# Patient Record
Sex: MALE | Race: WHITE | NOT HISPANIC OR LATINO | Employment: FULL TIME | URBAN - METROPOLITAN AREA
[De-identification: names, ages, dates, MRNs, and addresses within clinical notes are randomized per-mention and may not be internally consistent; named-entity substitution may affect disease eponyms.]

---

## 2023-09-13 ENCOUNTER — HOSPITAL ENCOUNTER (OUTPATIENT)
Dept: DATA CONVERSION | Facility: HOSPITAL | Age: 43
Discharge: HOME | End: 2023-09-13
Payer: COMMERCIAL

## 2023-09-13 DIAGNOSIS — Z00.00 ENCOUNTER FOR GENERAL ADULT MEDICAL EXAMINATION WITHOUT ABNORMAL FINDINGS: ICD-10-CM

## 2023-09-13 DIAGNOSIS — D64.9 ANEMIA, UNSPECIFIED: ICD-10-CM

## 2023-09-13 DIAGNOSIS — Z13.6 ENCOUNTER FOR SCREENING FOR CARDIOVASCULAR DISORDERS: ICD-10-CM

## 2023-09-13 DIAGNOSIS — E55.9 VITAMIN D DEFICIENCY, UNSPECIFIED: ICD-10-CM

## 2023-09-13 LAB
25(OH)D3 SERPL-MCNC: 32 NG/ML (ref 31–100)
ALBUMIN SERPL-MCNC: 4.9 GM/DL (ref 3.5–5)
ALBUMIN/GLOB SERPL: 1.7 RATIO (ref 1.5–3)
ALP BLD-CCNC: 62 U/L (ref 35–125)
ALT SERPL-CCNC: 47 U/L (ref 5–40)
ANION GAP SERPL CALCULATED.3IONS-SCNC: 14 MMOL/L (ref 0–19)
APPEARANCE PLAS: CLEAR
AST SERPL-CCNC: 40 U/L (ref 5–40)
BILIRUB SERPL-MCNC: 0.6 MG/DL (ref 0.1–1.2)
BUN SERPL-MCNC: 10 MG/DL (ref 8–25)
BUN/CREAT SERPL: 12.5 RATIO (ref 8–21)
CALCIUM SERPL-MCNC: 9.5 MG/DL (ref 8.5–10.4)
CHLORIDE SERPL-SCNC: 99 MMOL/L (ref 97–107)
CHOLEST SERPL-MCNC: 169 MG/DL (ref 133–200)
CHOLEST/HDLC SERPL: 2.1 RATIO
CO2 SERPL-SCNC: 24 MMOL/L (ref 24–31)
COLOR SPUN FLD: YELLOW
CREAT SERPL-MCNC: 0.8 MG/DL (ref 0.4–1.6)
DEPRECATED RDW RBC AUTO: 47.8 FL (ref 37–54)
ERYTHROCYTE [DISTWIDTH] IN BLOOD BY AUTOMATED COUNT: 13.3 % (ref 11.7–15)
FASTING STATUS PATIENT QL REPORTED: ABNORMAL
GFR SERPL CREATININE-BSD FRML MDRD: 113 ML/MIN/1.73 M2
GLOBULIN SER-MCNC: 2.9 G/DL (ref 1.9–3.7)
GLUCOSE SERPL-MCNC: 89 MG/DL (ref 65–99)
HCT VFR BLD AUTO: 36.9 % (ref 41–50)
HDLC SERPL-MCNC: 81 MG/DL
HGB BLD-MCNC: 12.4 GM/DL (ref 13.5–16.5)
LDLC SERPL CALC-MCNC: 80 MG/DL (ref 65–130)
MCH RBC QN AUTO: 32.5 PG (ref 26–34)
MCHC RBC AUTO-ENTMCNC: 33.6 % (ref 31–37)
MCV RBC AUTO: 96.9 FL (ref 80–100)
NRBC BLD-RTO: 0 /100 WBC
PLATELET # BLD AUTO: 126 K/UL (ref 150–450)
PMV BLD AUTO: 11.5 CU (ref 7–12.6)
POTASSIUM SERPL-SCNC: 4.2 MMOL/L (ref 3.4–5.1)
PROT SERPL-MCNC: 7.8 G/DL (ref 5.9–7.9)
RBC # BLD AUTO: 3.81 M/UL (ref 4.5–5.5)
SODIUM SERPL-SCNC: 137 MMOL/L (ref 133–145)
TRIGL SERPL-MCNC: 38 MG/DL (ref 40–150)
TSH SERPL DL<=0.05 MIU/L-ACNC: 1.48 MIU/L (ref 0.27–4.2)
WBC # BLD AUTO: 6.3 K/UL (ref 4.5–11)

## 2023-09-14 LAB
FERRITIN SERPL-MCNC: 493 NG/ML (ref 30–400)
FOLATE SERPL-MCNC: 20 NG/ML (ref 4.2–19.9)
IRON SATN MFR SERPL: 27.3 % (ref 12–50)
IRON SERPL-MCNC: 74 UG/DL (ref 45–160)
TIBC SERPL-MCNC: 271 UG/DL (ref 228–428)
VIT B12 SERPL-MCNC: 718 PG/ML (ref 211–946)

## 2023-11-25 DIAGNOSIS — I10 ESSENTIAL (PRIMARY) HYPERTENSION: ICD-10-CM

## 2023-11-27 RX ORDER — LISINOPRIL 40 MG/1
40 TABLET ORAL DAILY
Qty: 90 TABLET | Refills: 0 | Status: SHIPPED | OUTPATIENT
Start: 2023-11-27 | End: 2024-01-13 | Stop reason: HOSPADM

## 2023-12-28 DIAGNOSIS — M06.09 POLYARTHRITIS WITH NEGATIVE RHEUMATOID FACTOR (MULTI): ICD-10-CM

## 2023-12-28 PROBLEM — R20.2 PARESTHESIA OF SKIN: Status: ACTIVE | Noted: 2023-12-28

## 2023-12-28 PROBLEM — R79.89 ELEVATED PROLACTIN LEVEL: Status: ACTIVE | Noted: 2023-12-28

## 2023-12-28 PROBLEM — F41.9 ANXIETY: Status: ACTIVE | Noted: 2023-12-28

## 2023-12-28 PROBLEM — I16.0 HYPERTENSIVE URGENCY: Status: ACTIVE | Noted: 2023-12-28

## 2023-12-28 PROBLEM — S91.319A FOOT LACERATION: Status: ACTIVE | Noted: 2023-12-28

## 2023-12-28 PROBLEM — I10 PRIMARY HYPERTENSION: Status: ACTIVE | Noted: 2023-12-28

## 2023-12-28 PROBLEM — E78.5 HYPERLIPIDEMIA: Status: ACTIVE | Noted: 2023-12-28

## 2023-12-28 PROBLEM — E66.9 OBESITY (BMI 30-39.9): Status: ACTIVE | Noted: 2023-12-28

## 2023-12-28 PROBLEM — G89.29 OTHER CHRONIC PAIN: Status: ACTIVE | Noted: 2023-12-28

## 2023-12-28 PROBLEM — M51.26 LUMBAR DISC HERNIATION: Status: ACTIVE | Noted: 2023-12-28

## 2023-12-28 RX ORDER — DICLOFENAC SODIUM 75 MG/1
75 TABLET, DELAYED RELEASE ORAL 2 TIMES DAILY
Qty: 60 TABLET | Refills: 3 | Status: SHIPPED | OUTPATIENT
Start: 2023-12-28 | End: 2024-01-08 | Stop reason: ENTERED-IN-ERROR

## 2024-01-08 ENCOUNTER — HOSPITAL ENCOUNTER (INPATIENT)
Facility: HOSPITAL | Age: 44
LOS: 4 days | Discharge: HOME | DRG: 308 | End: 2024-01-13
Attending: EMERGENCY MEDICINE | Admitting: INTERNAL MEDICINE
Payer: COMMERCIAL

## 2024-01-08 ENCOUNTER — APPOINTMENT (OUTPATIENT)
Dept: CARDIOLOGY | Facility: HOSPITAL | Age: 44
DRG: 308 | End: 2024-01-08
Payer: COMMERCIAL

## 2024-01-08 ENCOUNTER — APPOINTMENT (OUTPATIENT)
Dept: RADIOLOGY | Facility: HOSPITAL | Age: 44
DRG: 308 | End: 2024-01-08
Payer: COMMERCIAL

## 2024-01-08 DIAGNOSIS — I48.91 ATRIAL FIBRILLATION, UNSPECIFIED TYPE (MULTI): ICD-10-CM

## 2024-01-08 DIAGNOSIS — F10.939 ALCOHOL WITHDRAWAL SYNDROME WITH COMPLICATION (MULTI): ICD-10-CM

## 2024-01-08 DIAGNOSIS — I50.9 ACUTE CONGESTIVE HEART FAILURE, UNSPECIFIED HEART FAILURE TYPE (MULTI): ICD-10-CM

## 2024-01-08 DIAGNOSIS — R53.81 MALAISE AND FATIGUE: ICD-10-CM

## 2024-01-08 DIAGNOSIS — R06.02 SHORTNESS OF BREATH: ICD-10-CM

## 2024-01-08 DIAGNOSIS — R53.83 MALAISE AND FATIGUE: ICD-10-CM

## 2024-01-08 DIAGNOSIS — U07.1 COVID: ICD-10-CM

## 2024-01-08 DIAGNOSIS — Z92.89 STATUS POST ALCOHOL DETOXIFICATION: ICD-10-CM

## 2024-01-08 DIAGNOSIS — I10 PRIMARY HYPERTENSION: ICD-10-CM

## 2024-01-08 DIAGNOSIS — I10 HYPERTENSION, UNSPECIFIED TYPE: Primary | ICD-10-CM

## 2024-01-08 LAB
ALBUMIN SERPL-MCNC: 4.2 G/DL (ref 3.5–5)
ALP BLD-CCNC: 103 U/L (ref 35–125)
ALT SERPL-CCNC: 117 U/L (ref 5–40)
AMPHETAMINES UR QL SCN>1000 NG/ML: NEGATIVE
ANION GAP SERPL CALC-SCNC: 18 MMOL/L
APPEARANCE UR: CLEAR
AST SERPL-CCNC: 75 U/L (ref 5–40)
BARBITURATES UR QL SCN>300 NG/ML: NEGATIVE
BASOPHILS # BLD AUTO: 0.07 X10*3/UL (ref 0–0.1)
BASOPHILS NFR BLD AUTO: 0.8 %
BENZODIAZ UR QL SCN>300 NG/ML: NEGATIVE
BILIRUB SERPL-MCNC: 1.8 MG/DL (ref 0.1–1.2)
BILIRUB UR STRIP.AUTO-MCNC: NEGATIVE MG/DL
BUN SERPL-MCNC: 22 MG/DL (ref 8–25)
BZE UR QL SCN>300 NG/ML: NEGATIVE
CALCIUM SERPL-MCNC: 8.9 MG/DL (ref 8.5–10.4)
CANNABINOIDS UR QL SCN>50 NG/ML: NEGATIVE
CHLORIDE SERPL-SCNC: 97 MMOL/L (ref 97–107)
CO2 SERPL-SCNC: 21 MMOL/L (ref 24–31)
COLOR UR: YELLOW
CREAT SERPL-MCNC: 1.1 MG/DL (ref 0.4–1.6)
EOSINOPHIL # BLD AUTO: 0.04 X10*3/UL (ref 0–0.7)
EOSINOPHIL NFR BLD AUTO: 0.4 %
ERYTHROCYTE [DISTWIDTH] IN BLOOD BY AUTOMATED COUNT: 14.5 % (ref 11.5–14.5)
ETHANOL SERPL-MCNC: <0.01 G/DL
FENTANYL+NORFENTANYL UR QL SCN: NEGATIVE
FLUAV RNA RESP QL NAA+PROBE: NOT DETECTED
FLUBV RNA RESP QL NAA+PROBE: NOT DETECTED
GFR SERPL CREATININE-BSD FRML MDRD: 85 ML/MIN/1.73M*2
GLUCOSE SERPL-MCNC: 121 MG/DL (ref 65–99)
GLUCOSE UR STRIP.AUTO-MCNC: NORMAL MG/DL
HCT VFR BLD AUTO: 38.7 % (ref 41–52)
HGB BLD-MCNC: 13 G/DL (ref 13.5–17.5)
HYALINE CASTS #/AREA URNS AUTO: ABNORMAL /LPF
IMM GRANULOCYTES # BLD AUTO: 0.04 X10*3/UL (ref 0–0.7)
IMM GRANULOCYTES NFR BLD AUTO: 0.4 % (ref 0–0.9)
KETONES UR STRIP.AUTO-MCNC: ABNORMAL MG/DL
LEUKOCYTE ESTERASE UR QL STRIP.AUTO: NEGATIVE
LIPASE SERPL-CCNC: 22 U/L (ref 16–63)
LYMPHOCYTES # BLD AUTO: 1.23 X10*3/UL (ref 1.2–4.8)
LYMPHOCYTES NFR BLD AUTO: 13.4 %
MCH RBC QN AUTO: 32.3 PG (ref 26–34)
MCHC RBC AUTO-ENTMCNC: 33.6 G/DL (ref 32–36)
MCV RBC AUTO: 96 FL (ref 80–100)
METHADONE UR QL SCN>300 NG/ML: NEGATIVE
MONOCYTES # BLD AUTO: 0.73 X10*3/UL (ref 0.1–1)
MONOCYTES NFR BLD AUTO: 8 %
MUCOUS THREADS #/AREA URNS AUTO: ABNORMAL /LPF
NEUTROPHILS # BLD AUTO: 7.06 X10*3/UL (ref 1.2–7.7)
NEUTROPHILS NFR BLD AUTO: 77 %
NITRITE UR QL STRIP.AUTO: NEGATIVE
NRBC BLD-RTO: 0 /100 WBCS (ref 0–0)
NT-PROBNP SERPL-MCNC: 3595 PG/ML (ref 0–93)
OPIATES UR QL SCN>300 NG/ML: NEGATIVE
OXYCODONE UR QL: NEGATIVE
PCP UR QL SCN>25 NG/ML: NEGATIVE
PH UR STRIP.AUTO: 5.5 [PH]
PLATELET # BLD AUTO: 107 X10*3/UL (ref 150–450)
POTASSIUM SERPL-SCNC: 3.7 MMOL/L (ref 3.4–5.1)
PROT SERPL-MCNC: 6.9 G/DL (ref 5.9–7.9)
PROT UR STRIP.AUTO-MCNC: ABNORMAL MG/DL
RBC # BLD AUTO: 4.03 X10*6/UL (ref 4.5–5.9)
RBC # UR STRIP.AUTO: ABNORMAL /UL
RBC #/AREA URNS AUTO: ABNORMAL /HPF
RSV RNA RESP QL NAA+PROBE: NOT DETECTED
SARS-COV-2 RNA RESP QL NAA+PROBE: DETECTED
SODIUM SERPL-SCNC: 136 MMOL/L (ref 133–145)
SP GR UR STRIP.AUTO: 1.02
TROPONIN T SERPL-MCNC: 25 NG/L
TROPONIN T SERPL-MCNC: 25 NG/L
TROPONIN T SERPL-MCNC: 28 NG/L
UROBILINOGEN UR STRIP.AUTO-MCNC: ABNORMAL MG/DL
WBC # BLD AUTO: 9.2 X10*3/UL (ref 4.4–11.3)
WBC #/AREA URNS AUTO: ABNORMAL /HPF

## 2024-01-08 PROCEDURE — 86003 ALLG SPEC IGE CRUDE XTRC EA: CPT | Performed by: EMERGENCY MEDICINE

## 2024-01-08 PROCEDURE — 83880 ASSAY OF NATRIURETIC PEPTIDE: CPT | Performed by: EMERGENCY MEDICINE

## 2024-01-08 PROCEDURE — 99285 EMERGENCY DEPT VISIT HI MDM: CPT | Performed by: EMERGENCY MEDICINE

## 2024-01-08 PROCEDURE — G0378 HOSPITAL OBSERVATION PER HR: HCPCS

## 2024-01-08 PROCEDURE — 81001 URINALYSIS AUTO W/SCOPE: CPT | Performed by: EMERGENCY MEDICINE

## 2024-01-08 PROCEDURE — 2500000004 HC RX 250 GENERAL PHARMACY W/ HCPCS (ALT 636 FOR OP/ED): Performed by: EMERGENCY MEDICINE

## 2024-01-08 PROCEDURE — 80307 DRUG TEST PRSMV CHEM ANLYZR: CPT | Performed by: EMERGENCY MEDICINE

## 2024-01-08 PROCEDURE — 2500000001 HC RX 250 WO HCPCS SELF ADMINISTERED DRUGS (ALT 637 FOR MEDICARE OP): Performed by: INTERNAL MEDICINE

## 2024-01-08 PROCEDURE — 2500000004 HC RX 250 GENERAL PHARMACY W/ HCPCS (ALT 636 FOR OP/ED): Performed by: INTERNAL MEDICINE

## 2024-01-08 PROCEDURE — 96375 TX/PRO/DX INJ NEW DRUG ADDON: CPT

## 2024-01-08 PROCEDURE — 93005 ELECTROCARDIOGRAM TRACING: CPT

## 2024-01-08 PROCEDURE — 96366 THER/PROPH/DIAG IV INF ADDON: CPT

## 2024-01-08 PROCEDURE — 83690 ASSAY OF LIPASE: CPT | Performed by: EMERGENCY MEDICINE

## 2024-01-08 PROCEDURE — 2500000005 HC RX 250 GENERAL PHARMACY W/O HCPCS: Performed by: EMERGENCY MEDICINE

## 2024-01-08 PROCEDURE — 71046 X-RAY EXAM CHEST 2 VIEWS: CPT

## 2024-01-08 PROCEDURE — 99291 CRITICAL CARE FIRST HOUR: CPT

## 2024-01-08 PROCEDURE — 87637 SARSCOV2&INF A&B&RSV AMP PRB: CPT | Performed by: EMERGENCY MEDICINE

## 2024-01-08 PROCEDURE — 82077 ASSAY SPEC XCP UR&BREATH IA: CPT | Performed by: EMERGENCY MEDICINE

## 2024-01-08 PROCEDURE — 36415 COLL VENOUS BLD VENIPUNCTURE: CPT | Performed by: EMERGENCY MEDICINE

## 2024-01-08 PROCEDURE — 84484 ASSAY OF TROPONIN QUANT: CPT | Performed by: EMERGENCY MEDICINE

## 2024-01-08 PROCEDURE — 80053 COMPREHEN METABOLIC PANEL: CPT | Performed by: EMERGENCY MEDICINE

## 2024-01-08 PROCEDURE — 96365 THER/PROPH/DIAG IV INF INIT: CPT

## 2024-01-08 PROCEDURE — 85025 COMPLETE CBC W/AUTO DIFF WBC: CPT | Performed by: EMERGENCY MEDICINE

## 2024-01-08 PROCEDURE — 74177 CT ABD & PELVIS W/CONTRAST: CPT

## 2024-01-08 PROCEDURE — 2550000001 HC RX 255 CONTRASTS: Performed by: EMERGENCY MEDICINE

## 2024-01-08 PROCEDURE — 93010 ELECTROCARDIOGRAM REPORT: CPT | Performed by: INTERNAL MEDICINE

## 2024-01-08 RX ORDER — FOLIC ACID 1 MG/1
TABLET ORAL DAILY
COMMUNITY

## 2024-01-08 RX ORDER — LORAZEPAM 2 MG/ML
0.5 INJECTION INTRAMUSCULAR EVERY 2 HOUR PRN
Status: DISCONTINUED | OUTPATIENT
Start: 2024-01-08 | End: 2024-01-13 | Stop reason: HOSPADM

## 2024-01-08 RX ORDER — DIGOXIN 0.25 MG/ML
125 INJECTION INTRAMUSCULAR; INTRAVENOUS ONCE
Status: COMPLETED | OUTPATIENT
Start: 2024-01-08 | End: 2024-01-09

## 2024-01-08 RX ORDER — CITALOPRAM 10 MG/1
10 TABLET ORAL DAILY
COMMUNITY

## 2024-01-08 RX ORDER — THIAMINE HYDROCHLORIDE 100 MG/ML
100 INJECTION, SOLUTION INTRAMUSCULAR; INTRAVENOUS DAILY
Status: COMPLETED | OUTPATIENT
Start: 2024-01-08 | End: 2024-01-10

## 2024-01-08 RX ORDER — MULTIVIT-MIN/IRON FUM/FOLIC AC 7.5 MG-4
1 TABLET ORAL DAILY
Status: DISCONTINUED | OUTPATIENT
Start: 2024-01-08 | End: 2024-01-13 | Stop reason: HOSPADM

## 2024-01-08 RX ORDER — CARVEDILOL 6.25 MG/1
6.25 TABLET ORAL
Status: DISCONTINUED | OUTPATIENT
Start: 2024-01-08 | End: 2024-01-09

## 2024-01-08 RX ORDER — DILTIAZEM HCL/D5W 125 MG/125
5-15 PLASTIC BAG, INJECTION (ML) INTRAVENOUS CONTINUOUS
Status: DISCONTINUED | OUTPATIENT
Start: 2024-01-08 | End: 2024-01-08

## 2024-01-08 RX ORDER — KETOROLAC TROMETHAMINE 30 MG/ML
15 INJECTION, SOLUTION INTRAMUSCULAR; INTRAVENOUS ONCE
Status: COMPLETED | OUTPATIENT
Start: 2024-01-08 | End: 2024-01-08

## 2024-01-08 RX ORDER — ACETAMINOPHEN 160 MG/5ML
650 SOLUTION ORAL EVERY 4 HOURS PRN
Status: DISCONTINUED | OUTPATIENT
Start: 2024-01-08 | End: 2024-01-13 | Stop reason: HOSPADM

## 2024-01-08 RX ORDER — LORAZEPAM 2 MG/ML
1 INJECTION INTRAMUSCULAR EVERY 2 HOUR PRN
Status: DISCONTINUED | OUTPATIENT
Start: 2024-01-08 | End: 2024-01-13 | Stop reason: HOSPADM

## 2024-01-08 RX ORDER — TALC
3 POWDER (GRAM) TOPICAL NIGHTLY PRN
Status: DISCONTINUED | OUTPATIENT
Start: 2024-01-08 | End: 2024-01-13 | Stop reason: HOSPADM

## 2024-01-08 RX ORDER — LORAZEPAM 2 MG/ML
2 INJECTION INTRAMUSCULAR EVERY 2 HOUR PRN
Status: DISCONTINUED | OUTPATIENT
Start: 2024-01-08 | End: 2024-01-13 | Stop reason: HOSPADM

## 2024-01-08 RX ORDER — FUROSEMIDE 10 MG/ML
40 INJECTION INTRAMUSCULAR; INTRAVENOUS ONCE
Status: COMPLETED | OUTPATIENT
Start: 2024-01-08 | End: 2024-01-08

## 2024-01-08 RX ORDER — POLYETHYLENE GLYCOL 3350 17 G/17G
17 POWDER, FOR SOLUTION ORAL DAILY PRN
Status: DISCONTINUED | OUTPATIENT
Start: 2024-01-08 | End: 2024-01-13 | Stop reason: HOSPADM

## 2024-01-08 RX ORDER — ACETAMINOPHEN 650 MG/1
650 SUPPOSITORY RECTAL EVERY 4 HOURS PRN
Status: DISCONTINUED | OUTPATIENT
Start: 2024-01-08 | End: 2024-01-13 | Stop reason: HOSPADM

## 2024-01-08 RX ORDER — LISINOPRIL 20 MG/1
40 TABLET ORAL DAILY
Status: DISCONTINUED | OUTPATIENT
Start: 2024-01-09 | End: 2024-01-09

## 2024-01-08 RX ORDER — ACETAMINOPHEN 325 MG/1
650 TABLET ORAL ONCE
Status: COMPLETED | OUTPATIENT
Start: 2024-01-08 | End: 2024-01-08

## 2024-01-08 RX ORDER — FAMOTIDINE 10 MG/ML
20 INJECTION INTRAVENOUS ONCE
Status: COMPLETED | OUTPATIENT
Start: 2024-01-08 | End: 2024-01-08

## 2024-01-08 RX ORDER — FOLIC ACID 1 MG/1
1 TABLET ORAL DAILY
Status: DISCONTINUED | OUTPATIENT
Start: 2024-01-09 | End: 2024-01-13 | Stop reason: HOSPADM

## 2024-01-08 RX ORDER — ONDANSETRON HYDROCHLORIDE 2 MG/ML
4 INJECTION, SOLUTION INTRAVENOUS ONCE
Status: COMPLETED | OUTPATIENT
Start: 2024-01-08 | End: 2024-01-08

## 2024-01-08 RX ORDER — DILTIAZEM HYDROCHLORIDE 5 MG/ML
15 INJECTION INTRAVENOUS ONCE
Status: COMPLETED | OUTPATIENT
Start: 2024-01-08 | End: 2024-01-08

## 2024-01-08 RX ORDER — LANOLIN ALCOHOL/MO/W.PET/CERES
100 CREAM (GRAM) TOPICAL DAILY
Status: DISCONTINUED | OUTPATIENT
Start: 2024-01-11 | End: 2024-01-13 | Stop reason: HOSPADM

## 2024-01-08 RX ORDER — ACETAMINOPHEN 325 MG/1
650 TABLET ORAL EVERY 4 HOURS PRN
Status: DISCONTINUED | OUTPATIENT
Start: 2024-01-08 | End: 2024-01-13 | Stop reason: HOSPADM

## 2024-01-08 RX ORDER — CITALOPRAM 10 MG/1
10 TABLET ORAL DAILY
Status: DISCONTINUED | OUTPATIENT
Start: 2024-01-09 | End: 2024-01-13 | Stop reason: HOSPADM

## 2024-01-08 RX ADMIN — CARVEDILOL 6.25 MG: 6.25 TABLET, FILM COATED ORAL at 19:13

## 2024-01-08 RX ADMIN — Medication 1 TABLET: at 19:13

## 2024-01-08 RX ADMIN — KETOROLAC TROMETHAMINE 15 MG: 30 INJECTION INTRAMUSCULAR; INTRAVENOUS at 09:05

## 2024-01-08 RX ADMIN — SODIUM CHLORIDE 1000 ML: 900 INJECTION, SOLUTION INTRAVENOUS at 09:07

## 2024-01-08 RX ADMIN — Medication 15 MG/HR: at 15:50

## 2024-01-08 RX ADMIN — Medication 15 MG/HR: at 09:06

## 2024-01-08 RX ADMIN — THIAMINE HYDROCHLORIDE 100 MG: 100 INJECTION, SOLUTION INTRAMUSCULAR; INTRAVENOUS at 19:13

## 2024-01-08 RX ADMIN — DILTIAZEM HYDROCHLORIDE 15 MG: 5 INJECTION, SOLUTION INTRAVENOUS at 09:02

## 2024-01-08 RX ADMIN — ONDANSETRON 4 MG: 2 INJECTION INTRAMUSCULAR; INTRAVENOUS at 09:06

## 2024-01-08 RX ADMIN — FUROSEMIDE 40 MG: 10 INJECTION, SOLUTION INTRAMUSCULAR; INTRAVENOUS at 19:13

## 2024-01-08 RX ADMIN — IOHEXOL 75 ML: 350 INJECTION, SOLUTION INTRAVENOUS at 09:45

## 2024-01-08 RX ADMIN — ACETAMINOPHEN 650 MG: 325 TABLET ORAL at 09:05

## 2024-01-08 RX ADMIN — FAMOTIDINE 20 MG: 10 INJECTION INTRAVENOUS at 09:06

## 2024-01-08 ASSESSMENT — LIFESTYLE VARIABLES
VISUAL DISTURBANCES: NOT PRESENT
AUDITORY DISTURBANCES: NOT PRESENT
HEADACHE, FULLNESS IN HEAD: NOT PRESENT
AGITATION: NORMAL ACTIVITY
BLOOD PRESSURE: 118/90
TOTAL SCORE: 0
NAUSEA AND VOMITING: NO NAUSEA AND NO VOMITING
TREMOR: NO TREMOR
ANXIETY: NO ANXIETY, AT EASE
PULSE: 96
ORIENTATION AND CLOUDING OF SENSORIUM: ORIENTED AND CAN DO SERIAL ADDITIONS
PAROXYSMAL SWEATS: NO SWEAT VISIBLE

## 2024-01-08 ASSESSMENT — COLUMBIA-SUICIDE SEVERITY RATING SCALE - C-SSRS
1. IN THE PAST MONTH, HAVE YOU WISHED YOU WERE DEAD OR WISHED YOU COULD GO TO SLEEP AND NOT WAKE UP?: NO
2. HAVE YOU ACTUALLY HAD ANY THOUGHTS OF KILLING YOURSELF?: NO
6. HAVE YOU EVER DONE ANYTHING, STARTED TO DO ANYTHING, OR PREPARED TO DO ANYTHING TO END YOUR LIFE?: NO

## 2024-01-08 ASSESSMENT — PAIN - FUNCTIONAL ASSESSMENT: PAIN_FUNCTIONAL_ASSESSMENT: 0-10

## 2024-01-08 ASSESSMENT — PAIN SCALES - GENERAL: PAINLEVEL_OUTOF10: 4

## 2024-01-08 NOTE — PROGRESS NOTES
Attestation note/supervisory note for NATALIE Calvin      The patient is a 43-year-old male presenting to the emergency department for evaluation of multiple symptoms.  The patient states that he recently quit drinking.  He states he stopped drinking alcohol about 4 to 5 days ago.  He is trying to detox at home.  He states that he has been having some generalized malaise and fatigue, diffuse myalgias, intermittent headache, shortness of breath, abdominal pain, diarrhea, and intermittent nausea since then.  No sick contacts or recent travel.  The headache is a dull aching pain.  No better or worse.  No radiation.  He states he tried to use his wife's albuterol inhaler for his shortness of breath but it did not help.  He states he felt like his eyes were bulging out when he used it.  No visual changes.  No neck or back pain.  No chest pain.  No palpitations.  No diaphoresis.  The abdominal pain is diffuse.  No better or worse.  No radiation.  It is constant.  No cough or congestion.  No fever or chills.  He has intermittent nausea without vomiting.  He states he has had several loose stools over the past several days.  No urinary complaints.  No recent travel or immobility.  No recent surgery.  No recent antibiotic use.  All pertinent positives and negatives are recorded above.  All other systems reviewed and otherwise negative.  Vital signs with hypertension but otherwise within normal limits.  Physical exam with a well-nourished well-developed male in no acute distress.  HEENT exam within normal limits.  He has no evidence of airway compromise or respiratory distress.  He has some mild diffuse abdominal tenderness to palpation.  No rebound or guarding.  No palpable masses.  No flank pain with percussion or palpation.  He has no gross motor, neurologic or vascular deficits on exam.      EKG with atrial fibrillation with rapid ventricular response at 153 bpm, normal axis, normal voltage, normal ST segment, normal T  waves      Oral acetaminophen, IV Pepcid, IV Toradol, IV Zofran and IV fluids ordered.  IV diltiazem also ordered.      Diagnostic labs with positive COVID-19 test, mildly elevated troponin T results, mild thrombocytopenia , transaminitis and mild hyperbilirubinemia but otherwise unremarkable.      Initial troponin T 28.  Repeat Troponin T 25      COVID-19 testing positive      XR chest 2 views   Final Result   Cardiac silhouette appears mildly enlarged. No acute cardiopulmonary   process.             Signed by: Fercho Ordoñez 1/8/2024 10:41 AM   Dictation workstation:   [id number]      CT abdomen pelvis w IV contrast   Final Result   Minimal-mild ascites within the abdomen and pelvis.        Small bilateral pleural effusions.        Diverticulosis of the colon without evidence for acute diverticulitis.        Otherwise, no additional findings are identified.        Signed by: Fercho Ordoñez 1/8/2024 10:53 AM   Dictation workstation:   [id number]           The patient does not have any evidence of ischemia on EKG but does have mildly elevated troponin T values.  No events on telemetry other than the atrial fibrillation.  Chest x-ray without acute process.  No evidence of pneumonia or CHF.  The CT abdomen pelvis does show small bilateral pleural effusions and ascites.      The patient was admitted for further management of his new onset atrial fibrillation and trending of his cardiac enzymes.      Impression/diagnosis  Malaise and fatigue  Abdominal pain, diffuse  Hypertension, unspecified  Nausea without vomiting  Atrial fibrillation with rapid ventricular response  Hyperbilirubinemia  Transaminitis  COVID-19  Ascites  Bilateral pleural effusions  Diverticular disease without evidence of diverticulitis      Critical care time of  31 minutes billed for management of atrial fibrillation with rapid ventricular response with initiation of oral aspirin, initiation of IV diltiazem, initiation of IV fluids,  monitoring the patient to telemetry, consultation with the patient regarding his results and arrangement for admission.  This time excludes time for billable procedures.      critical care time billed for by me is non concurrent with time billed for by NATALIE Calvin      I personally saw the patient and performed a substantive portion of the visit including all aspects of the medical decision making.      I reviewed the results of the diagnostic labs and diagnostic imaging.  Formal radiology reading was completed by the radiologist      Christi Byrne MD

## 2024-01-08 NOTE — ED PROVIDER NOTES
HPI   Chief Complaint   Patient presents with    Shortness of Breath     For the past 2 days I have had sob I went into the work and was sob and weak I have been coughing up clear mucus. Pt has been detox ing off of etoh since Wednesday night he has been drinking 1 1/2 bottles of vodka for 26 yrs       HPI  43-year-old male here for evaluation of generalized fatigue.  Patient says that he is detoxing from alcohol on his own, 3 to 4 days out.  He says that he just feels fatigued and rundown and just is in general feeling very well and wanted to come in for assessment.                  Lillian Coma Scale Score: 15                  Patient History   No past medical history on file.  No past surgical history on file.  No family history on file.  Social History     Tobacco Use    Smoking status: Not on file    Smokeless tobacco: Not on file   Substance Use Topics    Alcohol use: Not on file    Drug use: Not on file       Physical Exam   ED Triage Vitals   Temp Pulse Resp BP   -- -- -- --      SpO2 Temp src Heart Rate Source Patient Position   -- -- -- --      BP Location FiO2 (%)     -- --       Physical Exam  PHYSICAL EXAMINATION    GENERAL APPEARANCE: Awake and alert.     VITAL SIGNS: As per the nurses' triage record.     HEENT: Normocephalic, atraumatic. Extraocular muscles are intact. Pupils equal round and reactive to light. Conjunctiva are pink. Negative scleral icterus. Mucous membranes are moist. Tongue in the midline. Pharynx was without erythema or exudates, uvula midline    NECK: Soft Nontender and supple, full gross ROM, no meningeal signs.    CHEST: Nontender to palpation. Clear to auscultation bilaterally. No rales, rhonchi, or wheezing.     HEART: S1, S2. Regular rate and rhythm. No murmurs, gallops or rubs.  Strong and equal pulses in the extremities.     ABDOMEN: Soft, nontender, nondistended, positive bowel sounds, no palpable masses.    MUSCULOSKELETAL: The calves are nontender to palpation. Full gross  active range of motion. Ambulating on own with no acute difficulties     NEUROLOGICAL: Awake, alert and oriented x 3. Power intact in the upper and lower extremities. Sensation is intact to light touch in the upper and lower extremities.     IMMUNOLOGICAL: No lymphatic streaking noted     DERM: No petechiae, rashes, or ecchymoses.  ED Course & MDM   ED Course as of 01/08/24 1507   Mon Jan 08, 2024   1105 Heart Rate(!): 140  Heart rate was recorded at 140, nursing stated that they did not feel it was that high, they are going to redo vitals momentarily as it has been approximately an hour since the last set of vital signs [AP]      ED Course User Index  [AP] Bridger Calvin PA-C         Diagnoses as of 01/08/24 1507   Hypertension, unspecified type   Malaise and fatigue   Status post alcohol detoxification   COVID   Shortness of breath   Acute congestive heart failure, unspecified heart failure type (CMS/HCC)   Atrial fibrillation, unspecified type (CMS/HCC)   Alcohol withdrawal syndrome with complication (CMS/HCC)       Medical Decision Making    Patient has been seen in conjunction with attending physician Dr. Byrne    Parts of this chart have been completed using voice recognition software. Please excuse any errors of transcription.  My thought process and reason for plan has been formulated from the time that I saw the patient until the time of disposition and is not specific to one specific moment during their visit and furthermore my MDM encompasses this entire chart and not only this text box.      HPI: Detailed above.    Exam: A medically appropriate exam performed, outlined above, given the known history and presentation.    History obtained from: The patient    EKG: Obtained read by attending physician reviewed by myself    Social Determinants of Health considered during this visit: Lives at home    Medications given during visit:  Medications   dilTIAZem (Cardizem) 125 mg in dextrose 5% 125 mL (1  mg/mL) infusion (premix) (15 mg/hr intravenous New Bag 1/8/24 0906)   sodium chloride 0.9 % bolus 1,000 mL (0 mL intravenous Stopped 1/8/24 1007)   ondansetron (Zofran) injection 4 mg (4 mg intravenous Given 1/8/24 0906)   ketorolac (Toradol) injection 15 mg (15 mg intravenous Given 1/8/24 0905)   famotidine PF (Pepcid) injection 20 mg (20 mg intravenous Given 1/8/24 0906)   acetaminophen (Tylenol) tablet 650 mg (650 mg oral Given 1/8/24 0905)   dilTIAZem (Cardizem) injection 15 mg (15 mg intravenous Given 1/8/24 0902)   iohexol (OMNIPaque) 350 mg iodine/mL solution 75 mL (75 mL intravenous Given 1/8/24 0945)        Diagnostic/tests  Labs Reviewed   URINALYSIS WITH REFLEX MICROSCOPIC - Abnormal       Result Value    Color, Urine Yellow      Appearance, Urine Clear      Specific Gravity, Urine 1.024      pH, Urine 5.5      Protein, Urine 200 (2+) (*)     Glucose, Urine Normal      Blood, Urine 0.03 (TRACE) (*)     Ketones, Urine TRACE (*)     Bilirubin, Urine NEGATIVE      Urobilinogen, Urine 2 (1+) (*)     Nitrite, Urine NEGATIVE      Leukocyte Esterase, Urine NEGATIVE     N-TERMINAL PROBNP - Abnormal    PROBNP 3,595 (*)     Narrative:     Reference ranges are based on clinical submission data. These ranges represent the 95th percentile of normal cut-off points. As NT Pro- BNP values approach 1000 pg/ml, clinical symptoms are more likely associated with CHF.   CBC WITH AUTO DIFFERENTIAL - Abnormal    WBC 9.2      nRBC 0.0      RBC 4.03 (*)     Hemoglobin 13.0 (*)     Hematocrit 38.7 (*)     MCV 96      MCH 32.3      MCHC 33.6      RDW 14.5      Platelets 107 (*)     Neutrophils % 77.0      Immature Granulocytes %, Automated 0.4      Lymphocytes % 13.4      Monocytes % 8.0      Eosinophils % 0.4      Basophils % 0.8      Neutrophils Absolute 7.06      Immature Granulocytes Absolute, Automated 0.04      Lymphocytes Absolute 1.23      Monocytes Absolute 0.73      Eosinophils Absolute 0.04      Basophils Absolute 0.07      COMPREHENSIVE METABOLIC PANEL - Abnormal    Glucose 121 (*)     Sodium 136      Potassium 3.7      Chloride 97      Bicarbonate 21 (*)     Urea Nitrogen 22      Creatinine 1.10      eGFR 85      Calcium 8.9      Albumin 4.2      Alkaline Phosphatase 103      Total Protein 6.9      AST 75 (*)     Bilirubin, Total 1.8 (*)      (*)     Anion Gap 18     SARS-COV-2 AND INFLUENZA A/B PCR - Abnormal    Flu A Result Not Detected      Flu B Result Not Detected      Coronavirus 2019, PCR Detected (*)     Narrative:     This assay has received FDA Emergency Use Authorization (EUA) and  is only authorized for the duration of time that circumstances exist to justify the authorization of the emergency use of in vitro diagnostic tests for the detection of SARS-CoV-2 virus and/or diagnosis of COVID-19 infection under section 564(b)(1) of the Act, 21 U.S.C. 360bbb-3(b)(1). Testing for SARS-CoV-2 is only recommended for patients who meet current clinical and/or epidemiological criteria as defined by federal, state, or local public health directives. This assay is an in vitro diagnostic nucleic acid amplification test for the qualitative detection of SARS-CoV-2, Influenza A, and Influenza B from nasopharyngeal specimens and has been validated for use at [facility]. Negative results do not preclude COVID-19 infections or Influenza A/B infections, and should not be used as the sole basis for diagnosis, treatment, or other management decisions. If Influenza A/B and RSV PCR results are negative, testing for Parainfluenza virus, Adenovirus and Metapneumovirus is routinely performed for [facility] pediatric oncology and intensive care inpatients, and is available on other patients by placing an add-on request.    SERIAL TROPONIN, INITIAL ([facility]) - Abnormal    Troponin T, High Sensitivity 28 (*)    SERIAL TROPONIN,  2 HOUR ([facility]) - Abnormal    Troponin T, High Sensitivity 25 (*)    MICROSCOPIC ONLY, URINE - Abnormal     WBC, Urine 1-5      RBC, Urine 1-2      Mucus, Urine 1+      Hyaline Casts, Urine 3+ (*)    ALCOHOL - Normal    Alcohol <0.010     DRUG SCREEN,URINE - Normal    Amphetamine Screen, Urine Negative      Barbiturate Screen, Urine Negative      Benzodiazepines Screen, Urine Negative      Cannabinoid Screen, Urine Negative      Cocaine Metabolite Screen, Urine Negative      Fentanyl Screen, Urine Negative      Methadone Screen, Urine Negative      Opiate Screen, Urine Negative      Oxycodone Screen, Urine Negative      PCP Screen, Urine Negative      Narrative:     These toxicological screening tests provide unconfirmed qualitative measurements to aid in treatment and diagnosis in cases of drug use or overdose. This test is used only for medical purposes. A positive result does not indicate or measure intoxication. For specific test performance or pathologist consultation, please contact the Laboratory.    The following threshold concentrations are used for these analyses.Values at or above the threshold concentration are reported as positive. Values below the threshold are reported as negative.    Drug /Screening Threshold                                                                                                 THC/CANNABINOIDS................50 ng/ml  METHADONE......................300 ng/ml  COCAINE METABOLITES............300 ng/ml  BENZODIAZEPINE.................300 ng/ml  PCP.............................25 ng/ml  OPIATE.........................300 ng/ml  AMPHETAMINE/ECSTASY...........1000 ng/ml  BARBITURATE....................200 ng/ml  OXYCODONE......................100 ng/ml  FENTANYL.........................5 ng/ml       LIPASE - Normal    Lipase 22     RSV PCR - Normal    RSV PCR Not Detected      Narrative:     This assay is an FDA-cleared, in vitro diagnostic nucleic acid amplification test for the detection of RSV from nasopharyngeal specimens, and has been validated for use at [facility]. Negative results do not preclude RSV infections, and should not be used as the sole basis for diagnosis, treatment, or other management decisions. If Influenza A/B and RSV PCR results are negative, testing for Parainfluenza virus, Adenovirus and Metapneumovirus is routinely performed for pediatric oncology and intensive care inpatients at [facility], and is available on other patients by placing an add-on request.       TROPONIN T SERIES, HIGH SENSITIVITY (0, 2 HR, 6 HR)    Narrative:     The following orders were created for panel order Troponin T Series, High Sensitivity (0, 2HR, 6HR).  Procedure                               Abnormality         Status                     ---------                               -----------         ------                     Serial Troponin, Initial...[[id number]]  Abnormal            Final result               Serial Troponin, 2 Hour ...[[id number]]  Abnormal            Final result               Serial Troponin, 6 Hour ...[[id number]]                                                   Please view results for these tests on the individual orders.   ALLERGEN ONION IGE   SERIAL TROPONIN, 6 HOUR ([facility])      XR chest 2 views   Final Result   Cardiac silhouette appears mildly enlarged. No acute cardiopulmonary   process.             Signed by: Fercho Ordoñez 1/8/2024 10:41 AM   Dictation workstation:   [id number]      CT abdomen pelvis w IV contrast   Final Result   Minimal-mild ascites within the abdomen and pelvis.        Small bilateral pleural effusions.        Diverticulosis of the colon without evidence for acute diverticulitis.        Otherwise, no additional findings are identified.        Signed by: Fercho Ordoñez 1/8/2024 10:53 AM   Dictation workstation:   [id number]           Considerations/further MDM:  I spoke with Dr. Byrne who spoke to hospitalist. We thoroughly discussed the history, physical exam, laboratory and imaging studies, as well as, emergency  department course. Based upon that discussion, we've decided to admit for further observation and evaluation of their symptoms.  As I have deemed necessary from their history, physical, and studies, I have considered and evaluated for the following diagnoses: Acute coronary syndrome including MI, intracranial hemorrhage, malignant dysrhythmia, hypertension, considered pericardial tamponade, pneumothorax, hemothorax, blunt force injury or trauma, considered pulmonary embolism sepsis I also considered stroke pneumonia or respiratory distress or respiratory compromise, pericardial effusion and dissection      Procedure  Procedures     Bridger Calvin PA-C  01/08/24 1506       Bridger Calvin PA-C  01/08/24 1507

## 2024-01-08 NOTE — H&P
History Of Present Illness  Justo Whitehead is a 43 y.o. male presenting with shortness of breath and palpitations.  He has been experiencing symptoms for the past 4 days.  He reported shortness of breath when going up the stairs which was unusual.  He also reported shortness of breath when lying flat and trying to sleep at night.  He has been experiencing palpitations.  He went into work today and when walking from the parking lot to his workspace, he became very short of breath and had palpitations which were quite intense..  He was unable to work today and came to the emergency department.  He was found to be in rapid atrial fibrillation with a heart rate of 153/min.  Oxygen saturation was normal at 96%.  Troponin levels were 28, 25 and 25.  He tested positive for COVID-19.  He has been started on a cardizem drip and his heart rate was in the 90s.      Past Medical History  He has a past medical history of Anxiety and Hypertension.    Surgical History  He has a past surgical history that includes Foot Tendon Surgery (Right).     Social History  He reports that he has never smoked. He has never used smokeless tobacco. He reports current alcohol use. No history on file for drug use.    Family History  Family History   Problem Relation Name Age of Onset    Hypertension Mother          Allergies  Patient has no known allergies.    Review of Systems   General: Negative for fever,  chills or fatigue.    HEENT: Negative for headache, blurring of vision or double vision.    Cardiovascular: As in the HPI   Respiratory: As in the HPI   Gastrointestinal: Negative for nausea, vomiting, hematemesis, abdominal pain or diarrhea.   Genitourinary: Negative for dysuria, hematuria, frequency or nocturia.   Musculoskeletal: Negative for joint pain, joint swelling or deformity.   Skin: Negative for rash, itching, or jaundice.   Hematologic: Negative for bleeding or bruising.   Neurologic: Negative for headache, loss of  consciousness. syncope or seizures       Physical Exam   General.: Awake alert well-developed, responsive   HEENT: Normocephalic, not icteric, not pale, no facial asymmetry, no pharyngeal erythema.   Neck: Supple, no carotid bruit, no thyroid enlargement.   Cardiovascular: Irregular heart  rhythm normal S1 and S2.   Respiratory: Equal breath sounds bilaterally clear to auscultation.   Abdomen: Soft, nontender to palpation, bowel sounds present and normoactive.   Extremities: No peripheral cyanosis, no pedal edema.   Neurologic: Alert and oriented to self, place and date, muscle strength 5/5 in all extremities.   Dermatologic: No rash, ecchymosis, or jaundice.   Psychological: Appropriate affect and behavior.       Last Recorded Vitals  BP (!) 117/99   Pulse 87   Temp 36.5 °C (97.7 °F) (Oral)   Resp 24   Wt 118 kg (260 lb 2.3 oz)   SpO2 98%     Relevant Results  Results for orders placed or performed during the hospital encounter of 01/08/24 (from the past 24 hour(s))   Urinalysis with Reflex Microscopic   Result Value Ref Range    Color, Urine Yellow Light-Yellow, Yellow, Dark-Yellow    Appearance, Urine Clear Clear    Specific Gravity, Urine 1.024 1.005 - 1.035    pH, Urine 5.5 5.0, 5.5, 6.0, 6.5, 7.0, 7.5, 8.0    Protein, Urine 200 (2+) (A) NEGATIVE, 10 (TRACE), 20 (TRACE) mg/dL    Glucose, Urine Normal Normal mg/dL    Blood, Urine 0.03 (TRACE) (A) NEGATIVE    Ketones, Urine TRACE (A) NEGATIVE mg/dL    Bilirubin, Urine NEGATIVE NEGATIVE    Urobilinogen, Urine 2 (1+) (A) Normal mg/dL    Nitrite, Urine NEGATIVE NEGATIVE    Leukocyte Esterase, Urine NEGATIVE NEGATIVE   Ethanol   Result Value Ref Range    Alcohol <0.010 0.000 - 0.010 g/dL   Drug Screen, Urine   Result Value Ref Range    Amphetamine Screen, Urine Negative      Barbiturate Screen, Urine Negative      Benzodiazepines Screen, Urine Negative      Cannabinoid Screen, Urine Negative      Cocaine Metabolite Screen, Urine Negative      Fentanyl Screen,  Urine Negative       Methadone Screen, Urine Negative      Opiate Screen, Urine Negative      Oxycodone Screen, Urine Negative      PCP Screen, Urine Negative     NT Pro-BNP   Result Value Ref Range    PROBNP 3,595 (H) 0 - 93 pg/mL   CBC and Auto Differential   Result Value Ref Range    WBC 9.2 4.4 - 11.3 x10*3/uL    nRBC 0.0 0.0 - 0.0 /100 WBCs    RBC 4.03 (L) 4.50 - 5.90 x10*6/uL    Hemoglobin 13.0 (L) 13.5 - 17.5 g/dL    Hematocrit 38.7 (L) 41.0 - 52.0 %    MCV 96 80 - 100 fL    MCH 32.3 26.0 - 34.0 pg    MCHC 33.6 32.0 - 36.0 g/dL    RDW 14.5 11.5 - 14.5 %    Platelets 107 (L) 150 - 450 x10*3/uL    Neutrophils % 77.0 40.0 - 80.0 %    Immature Granulocytes %, Automated 0.4 0.0 - 0.9 %    Lymphocytes % 13.4 13.0 - 44.0 %    Monocytes % 8.0 2.0 - 10.0 %    Eosinophils % 0.4 0.0 - 6.0 %    Basophils % 0.8 0.0 - 2.0 %    Neutrophils Absolute 7.06 1.20 - 7.70 x10*3/uL    Immature Granulocytes Absolute, Automated 0.04 0.00 - 0.70 x10*3/uL    Lymphocytes Absolute 1.23 1.20 - 4.80 x10*3/uL    Monocytes Absolute 0.73 0.10 - 1.00 x10*3/uL    Eosinophils Absolute 0.04 0.00 - 0.70 x10*3/uL    Basophils Absolute 0.07 0.00 - 0.10 x10*3/uL   Comprehensive metabolic panel   Result Value Ref Range    Glucose 121 (H) 65 - 99 mg/dL    Sodium 136 133 - 145 mmol/L    Potassium 3.7 3.4 - 5.1 mmol/L    Chloride 97 97 - 107 mmol/L    Bicarbonate 21 (L) 24 - 31 mmol/L    Urea Nitrogen 22 8 - 25 mg/dL    Creatinine 1.10 0.40 - 1.60 mg/dL    eGFR 85 >60 mL/min/1.73m*2    Calcium 8.9 8.5 - 10.4 mg/dL    Albumin 4.2 3.5 - 5.0 g/dL    Alkaline Phosphatase 103 35 - 125 U/L    Total Protein 6.9 5.9 - 7.9 g/dL    AST 75 (H) 5 - 40 U/L    Bilirubin, Total 1.8 (H) 0.1 - 1.2 mg/dL     (H) 5 - 40 U/L    Anion Gap 18 <=19 mmol/L   Lipase   Result Value Ref Range    Lipase 22 16 - 63 U/L   Serial Troponin, Initial ([facility])   Result Value Ref Range    Troponin T, High Sensitivity 28 (H) <=15 ng/L   Microscopic Only, Urine   Result Value Ref Range     WBC, Urine 1-5 1-5, NONE /HPF    RBC, Urine 1-2 NONE, 1-2, 3-5 /HPF    Mucus, Urine 1+ Reference range not established. /LPF    Hyaline Casts, Urine 3+ (A) NONE /LPF   Sars-CoV-2 and Influenza A/B PCR   Result Value Ref Range    Flu A Result Not Detected Not Detected    Flu B Result Not Detected Not Detected    Coronavirus 2019, PCR Detected (A) Not Detected   RSV PCR   Result Value Ref Range    RSV PCR Not Detected Not Detected   Serial Troponin, 2 Hour ([facility])   Result Value Ref Range    Troponin T, High Sensitivity 25 (H) <=15 ng/L   Serial Troponin, 6 Hour ([facility])   Result Value Ref Range    Troponin T, High Sensitivity 25 (H) <=15 ng/L     CT abdomen pelvis w IV contrast    Result Date: 1/8/2024  Interpreted By:  Fercho Ordoñez, STUDY: CT ABDOMEN PELVIS W IV CONTRAST; 1/8/2024 9:56 am   INDICATION: Signs/Symptoms:abdominal pain;   COMPARISON: None   ACCESSION NUMBER(S): [id number]   ORDERING CLINICIAN: SUZANNE LOPEZ   TECHNIQUE: Contiguous axial images of the abdomen/pelvis were performed with IV contrast. 75 ml of Omnipaque 350 was utilized. Coronal and sagittal reformatted images were also obtained. All CT examinations are performed with 1 or more of the following dose reduction techniques: Automated exposure control, adjustment of mA and/or kv according to patient's size, or use of iterative reconstruction techniques.     FINDINGS: The liver, gallbladder,  common bile duct, pancreas, spleen, and adrenal glands are unremarkable. Minimal pericholecystic fluid likely from minimal to mild generalized ascites.   The kidneys enhance symmetrically. No urolithiasis is seen. No hydroureteronephrosis is seen.   The visualized aorta is unremarkable.   The small bowel is not dilated. The appendix is within normal limits. Scattered diverticula in the colon without evidence for acute diverticulitis.   No free intraperitoneal air. There is a small amount of perihepatic and perisplenic ascites. There is additional  minimal to mild scattered free fluid in the abdomen and pelvis.   The bladder is well distended with no gross wall thickening.   The visualized osseous structures are intact.   Limited images of the lower thorax show small bilateral pleural effusions with adjacent dependent changes possibly atelectasis.       Minimal-mild ascites within the abdomen and pelvis.   Small bilateral pleural effusions.   Diverticulosis of the colon without evidence for acute diverticulitis.   Otherwise, no additional findings are identified.   Signed by: Fercho Ordoñez 1/8/2024 10:53 AM Dictation workstation:   [id number]    XR chest 2 views    Result Date: 1/8/2024  Interpreted By:  Fercho Ordoñez, STUDY: XR CHEST 2 VIEWS; 1/8/2024 10:02 am   INDICATION: Signs/Symptoms:dyspnea, malaise   COMPARISON: 01/12/2007   ACCESSION NUMBER(S): [id number]   ORDERING CLINICIAN: SUZANNE LOPEZ   TECHNIQUE: PA and LAT views of the chest were obtained.   FINDINGS: The cardiomediastinal silhouette is unremarkable. The lungs are clear. No pleural effusion is identified.   The osseous structures are intact.       Cardiac silhouette appears mildly enlarged. No acute cardiopulmonary process.     Signed by: Fercho Ordoñez 1/8/2024 10:41 AM Dictation workstation:   [id number]        Assessment/Plan     Atrial fibrillation with rapid ventricular response  Continue Cardizem drip  Start oral carvedilol  Continue cardiac monitoring.  Cardiology consult    Dyspnea on exertion  He has an enlarged cardiac silhouette on chest x-ray with dyspnea on exertion  2D echocardiogram to evaluate left ventricular function    Bilateral pleural effusions  Small bilateral pleural effusions and mild ascites with normal appearance of the liver.  Concern for congestive heart failure.  Await 2D echo    COVID-19  He is not hypoxic and does not require specific COVID-19 therapy    Hypertension  On lisinopril.  May need to decrease the dose of lisinopril if carvedilol needs to  be uptitrated to control his heart rate       Jaspreet Robertson MD

## 2024-01-09 ENCOUNTER — APPOINTMENT (OUTPATIENT)
Dept: CARDIOLOGY | Facility: HOSPITAL | Age: 44
DRG: 308 | End: 2024-01-09
Payer: COMMERCIAL

## 2024-01-09 PROBLEM — I10 HYPERTENSION, UNSPECIFIED TYPE: Status: ACTIVE | Noted: 2024-01-09

## 2024-01-09 LAB
ANION GAP SERPL CALC-SCNC: 18 MMOL/L
AORTIC VALVE PEAK VELOCITY: 1.03
AV PEAK GRADIENT: 4.2
AVA (PEAK VEL): 2.26
BUN SERPL-MCNC: 18 MG/DL (ref 8–25)
CALCIUM SERPL-MCNC: 8.7 MG/DL (ref 8.5–10.4)
CHLORIDE SERPL-SCNC: 102 MMOL/L (ref 97–107)
CO2 SERPL-SCNC: 22 MMOL/L (ref 24–31)
CREAT SERPL-MCNC: 0.9 MG/DL (ref 0.4–1.6)
EGFRCR SERPLBLD CKD-EPI 2021: >90 ML/MIN/1.73M*2
EJECTION FRACTION APICAL 4 CHAMBER: 41.2
ERYTHROCYTE [DISTWIDTH] IN BLOOD BY AUTOMATED COUNT: 14.2 % (ref 11.5–14.5)
GLUCOSE SERPL-MCNC: 84 MG/DL (ref 65–99)
HCT VFR BLD AUTO: 34.3 % (ref 41–52)
HGB BLD-MCNC: 11.8 G/DL (ref 13.5–17.5)
LEFT VENTRICLE INTERNAL DIMENSION DIASTOLE: 5.69 (ref 3.5–6)
LEFT VENTRICULAR OUTFLOW TRACT DIAMETER: 2
MCH RBC QN AUTO: 31.8 PG (ref 26–34)
MCHC RBC AUTO-ENTMCNC: 34.4 G/DL (ref 32–36)
MCV RBC AUTO: 93 FL (ref 80–100)
NRBC BLD-RTO: 0 /100 WBCS (ref 0–0)
PLATELET # BLD AUTO: 77 X10*3/UL (ref 150–450)
POTASSIUM SERPL-SCNC: 3.6 MMOL/L (ref 3.4–5.1)
RBC # BLD AUTO: 3.71 X10*6/UL (ref 4.5–5.9)
RIGHT VENTRICLE PEAK SYSTOLIC PRESSURE: 38.8
SODIUM SERPL-SCNC: 142 MMOL/L (ref 133–145)
TROPONIN T SERPL-MCNC: 30 NG/L
TSH SERPL DL<=0.05 MIU/L-ACNC: 2.76 MIU/L (ref 0.27–4.2)
WBC # BLD AUTO: 5.1 X10*3/UL (ref 4.4–11.3)

## 2024-01-09 PROCEDURE — 2500000001 HC RX 250 WO HCPCS SELF ADMINISTERED DRUGS (ALT 637 FOR MEDICARE OP): Performed by: INTERNAL MEDICINE

## 2024-01-09 PROCEDURE — 2500000004 HC RX 250 GENERAL PHARMACY W/ HCPCS (ALT 636 FOR OP/ED): Performed by: INTERNAL MEDICINE

## 2024-01-09 PROCEDURE — 36415 COLL VENOUS BLD VENIPUNCTURE: CPT | Performed by: INTERNAL MEDICINE

## 2024-01-09 PROCEDURE — 80048 BASIC METABOLIC PNL TOTAL CA: CPT | Performed by: INTERNAL MEDICINE

## 2024-01-09 PROCEDURE — 93306 TTE W/DOPPLER COMPLETE: CPT

## 2024-01-09 PROCEDURE — 99222 1ST HOSP IP/OBS MODERATE 55: CPT

## 2024-01-09 PROCEDURE — 85027 COMPLETE CBC AUTOMATED: CPT | Performed by: INTERNAL MEDICINE

## 2024-01-09 PROCEDURE — 93306 TTE W/DOPPLER COMPLETE: CPT | Performed by: INTERNAL MEDICINE

## 2024-01-09 PROCEDURE — 96376 TX/PRO/DX INJ SAME DRUG ADON: CPT

## 2024-01-09 PROCEDURE — 2060000001 HC INTERMEDIATE ICU ROOM DAILY

## 2024-01-09 PROCEDURE — 94762 N-INVAS EAR/PLS OXIMTRY CONT: CPT

## 2024-01-09 PROCEDURE — 84484 ASSAY OF TROPONIN QUANT: CPT | Performed by: INTERNAL MEDICINE

## 2024-01-09 PROCEDURE — 93005 ELECTROCARDIOGRAM TRACING: CPT

## 2024-01-09 PROCEDURE — 84443 ASSAY THYROID STIM HORMONE: CPT | Performed by: INTERNAL MEDICINE

## 2024-01-09 PROCEDURE — 2500000004 HC RX 250 GENERAL PHARMACY W/ HCPCS (ALT 636 FOR OP/ED)

## 2024-01-09 RX ORDER — DIGOXIN 0.25 MG/ML
250 INJECTION INTRAMUSCULAR; INTRAVENOUS ONCE
Status: COMPLETED | OUTPATIENT
Start: 2024-01-09 | End: 2024-01-09

## 2024-01-09 RX ORDER — LISINOPRIL 20 MG/1
20 TABLET ORAL DAILY
Status: DISCONTINUED | OUTPATIENT
Start: 2024-01-10 | End: 2024-01-13 | Stop reason: HOSPADM

## 2024-01-09 RX ORDER — DILTIAZEM HYDROCHLORIDE 5 MG/ML
10 INJECTION INTRAVENOUS ONCE
Status: DISCONTINUED | OUTPATIENT
Start: 2024-01-09 | End: 2024-01-09

## 2024-01-09 RX ORDER — DILTIAZEM HCL/D5W 125 MG/125
5-15 PLASTIC BAG, INJECTION (ML) INTRAVENOUS CONTINUOUS
Status: DISCONTINUED | OUTPATIENT
Start: 2024-01-09 | End: 2024-01-11

## 2024-01-09 RX ORDER — CARVEDILOL 12.5 MG/1
12.5 TABLET ORAL
Status: DISCONTINUED | OUTPATIENT
Start: 2024-01-10 | End: 2024-01-11

## 2024-01-09 RX ORDER — CARVEDILOL 6.25 MG/1
6.25 TABLET ORAL ONCE
Status: COMPLETED | OUTPATIENT
Start: 2024-01-09 | End: 2024-01-09

## 2024-01-09 RX ORDER — DIGOXIN 0.25 MG/ML
125 INJECTION INTRAMUSCULAR; INTRAVENOUS ONCE
Status: COMPLETED | OUTPATIENT
Start: 2024-01-09 | End: 2024-01-09

## 2024-01-09 RX ORDER — DIGOXIN 0.25 MG/ML
250 INJECTION INTRAMUSCULAR; INTRAVENOUS DAILY
Status: DISCONTINUED | OUTPATIENT
Start: 2024-01-10 | End: 2024-01-09

## 2024-01-09 RX ADMIN — Medication 5 MG/HR: at 18:57

## 2024-01-09 RX ADMIN — CITALOPRAM HYDROBROMIDE 10 MG: 10 TABLET ORAL at 08:15

## 2024-01-09 RX ADMIN — FOLIC ACID 1 MG: 1 TABLET ORAL at 08:15

## 2024-01-09 RX ADMIN — CARVEDILOL 6.25 MG: 6.25 TABLET, FILM COATED ORAL at 19:01

## 2024-01-09 RX ADMIN — CARVEDILOL 6.25 MG: 6.25 TABLET, FILM COATED ORAL at 16:11

## 2024-01-09 RX ADMIN — CARVEDILOL 6.25 MG: 6.25 TABLET, FILM COATED ORAL at 07:45

## 2024-01-09 RX ADMIN — DIGOXIN 125 MCG: 0.25 INJECTION INTRAMUSCULAR; INTRAVENOUS at 10:06

## 2024-01-09 RX ADMIN — DIGOXIN 125 MCG: 0.25 INJECTION INTRAMUSCULAR; INTRAVENOUS at 00:01

## 2024-01-09 RX ADMIN — LISINOPRIL 40 MG: 20 TABLET ORAL at 08:15

## 2024-01-09 RX ADMIN — THIAMINE HYDROCHLORIDE 100 MG: 100 INJECTION, SOLUTION INTRAMUSCULAR; INTRAVENOUS at 08:15

## 2024-01-09 RX ADMIN — DIGOXIN 250 MCG: 0.25 INJECTION INTRAMUSCULAR; INTRAVENOUS at 16:11

## 2024-01-09 RX ADMIN — Medication 1 TABLET: at 08:15

## 2024-01-09 ASSESSMENT — LIFESTYLE VARIABLES
TOTAL SCORE: 0
PAROXYSMAL SWEATS: NO SWEAT VISIBLE
ORIENTATION AND CLOUDING OF SENSORIUM: ORIENTED AND CAN DO SERIAL ADDITIONS
AGITATION: NORMAL ACTIVITY
AUDITORY DISTURBANCES: NOT PRESENT
NAUSEA AND VOMITING: NO NAUSEA AND NO VOMITING
AUDITORY DISTURBANCES: NOT PRESENT
EVER FELT BAD OR GUILTY ABOUT YOUR DRINKING: NO
PAROXYSMAL SWEATS: BARELY PERCEPTIBLE SWEATING, PALMS MOIST
ORIENTATION AND CLOUDING OF SENSORIUM: ORIENTED AND CAN DO SERIAL ADDITIONS
TOTAL SCORE: 0
ORIENTATION AND CLOUDING OF SENSORIUM: ORIENTED AND CAN DO SERIAL ADDITIONS
NAUSEA AND VOMITING: NO NAUSEA AND NO VOMITING
TOTAL SCORE: 0
ANXIETY: NO ANXIETY, AT EASE
ORIENTATION AND CLOUDING OF SENSORIUM: ORIENTED AND CAN DO SERIAL ADDITIONS
VISUAL DISTURBANCES: NOT PRESENT
HEADACHE, FULLNESS IN HEAD: NOT PRESENT
TOTAL SCORE: 0
REASON UNABLE TO ASSESS: NO
VISUAL DISTURBANCES: NOT PRESENT
AGITATION: NORMAL ACTIVITY
PAROXYSMAL SWEATS: NO SWEAT VISIBLE
AGITATION: NORMAL ACTIVITY
AGITATION: NORMAL ACTIVITY
HAVE PEOPLE ANNOYED YOU BY CRITICIZING YOUR DRINKING: NO
HAVE YOU EVER FELT YOU SHOULD CUT DOWN ON YOUR DRINKING: NO
TREMOR: NO TREMOR
PAROXYSMAL SWEATS: NO SWEAT VISIBLE
NAUSEA AND VOMITING: NO NAUSEA AND NO VOMITING
TREMOR: NO TREMOR
TREMOR: NO TREMOR
ORIENTATION AND CLOUDING OF SENSORIUM: ORIENTED AND CAN DO SERIAL ADDITIONS
TREMOR: NO TREMOR
TOTAL SCORE: 0
VISUAL DISTURBANCES: NOT PRESENT
EVER HAD A DRINK FIRST THING IN THE MORNING TO STEADY YOUR NERVES TO GET RID OF A HANGOVER: NO
VISUAL DISTURBANCES: NOT PRESENT
PAROXYSMAL SWEATS: NO SWEAT VISIBLE
ANXIETY: NO ANXIETY, AT EASE
NAUSEA AND VOMITING: NO NAUSEA AND NO VOMITING
HEADACHE, FULLNESS IN HEAD: MILD
ANXIETY: NO ANXIETY, AT EASE
HEADACHE, FULLNESS IN HEAD: NOT PRESENT
ORIENTATION AND CLOUDING OF SENSORIUM: ORIENTED AND CAN DO SERIAL ADDITIONS
ANXIETY: NO ANXIETY, AT EASE
NAUSEA AND VOMITING: NO NAUSEA AND NO VOMITING
PAROXYSMAL SWEATS: NO SWEAT VISIBLE
AUDITORY DISTURBANCES: NOT PRESENT
ANXIETY: NO ANXIETY, AT EASE
TOTAL SCORE: 3
AUDITORY DISTURBANCES: NOT PRESENT
HEADACHE, FULLNESS IN HEAD: NOT PRESENT
HEADACHE, FULLNESS IN HEAD: NOT PRESENT
PULSE: 120
VISUAL DISTURBANCES: NOT PRESENT
TREMOR: NO TREMOR
HEADACHE, FULLNESS IN HEAD: NOT PRESENT
TREMOR: NO TREMOR
AGITATION: NORMAL ACTIVITY
AGITATION: NORMAL ACTIVITY
NAUSEA AND VOMITING: NO NAUSEA AND NO VOMITING
ANXIETY: NO ANXIETY, AT EASE
AUDITORY DISTURBANCES: NOT PRESENT
AUDITORY DISTURBANCES: NOT PRESENT
VISUAL DISTURBANCES: NOT PRESENT

## 2024-01-09 ASSESSMENT — PAIN - FUNCTIONAL ASSESSMENT: PAIN_FUNCTIONAL_ASSESSMENT: 0-10

## 2024-01-09 ASSESSMENT — ACTIVITIES OF DAILY LIVING (ADL)
BATHING: INDEPENDENT
DRESSING YOURSELF: INDEPENDENT
WALKS IN HOME: INDEPENDENT
ADEQUATE_TO_COMPLETE_ADL: YES
GROOMING: INDEPENDENT
JUDGMENT_ADEQUATE_SAFELY_COMPLETE_DAILY_ACTIVITIES: YES
HEARING - RIGHT EAR: FUNCTIONAL
FEEDING YOURSELF: INDEPENDENT
HEARING - LEFT EAR: FUNCTIONAL
PATIENT'S MEMORY ADEQUATE TO SAFELY COMPLETE DAILY ACTIVITIES?: YES
TOILETING: INDEPENDENT

## 2024-01-09 ASSESSMENT — COGNITIVE AND FUNCTIONAL STATUS - GENERAL
PATIENT BASELINE BEDBOUND: NO
MOBILITY SCORE: 24
DAILY ACTIVITIY SCORE: 24

## 2024-01-09 ASSESSMENT — PAIN DESCRIPTION - DESCRIPTORS: DESCRIPTORS: ACHING

## 2024-01-09 ASSESSMENT — ENCOUNTER SYMPTOMS
DYSPNEA ON EXERTION: 1
IRREGULAR HEARTBEAT: 1

## 2024-01-09 ASSESSMENT — PAIN SCALES - GENERAL: PAINLEVEL_OUTOF10: 2

## 2024-01-09 NOTE — ED NOTES
Updated pt and wife on care. Pt stated he wants to see MD. MD notified. Pt HR has been high within the last few hours. MD notified. No new orders at this time.      Yessica Quinteros RN  01/09/24 [id number]

## 2024-01-09 NOTE — ED NOTES
Notified attending MD of High HR. Digoxin and Coreg has been administered however there has been no results. No new orders at this time      Yessica Quinteros RN  01/09/24 [id number]

## 2024-01-09 NOTE — CONSULTS
Inpatient consult to Cardiology  Consult performed by: MATEO Erazo-CNP  Consult ordered by: Jaspreet Robertson MD  Reason for consult: Atrial Fibrillation with Rapid Ventricular Response        History Of Present Illness:    Justo Whitehead is a 43 y.o. male presenting with shortness of breath.  Patient states that over the last week he has had progressively worsening shortness of breath at rest and with exertion.  Patient also reports occasional palpitations.  Denies chest pain or pressure.  Denies nausea or vomiting.  Patient also has a history of alcohol use drinking approximately 1 and half bottles of vodka at home per day and states that he has been doing a home detox since last Wednesday.  Patient presented to the emergency department yesterday and on arrival was found to be in rapid atrial fibrillation with a ventricular rate of 153 beats per minute.  Opponent levels were drawn and were elevated but flat at 28, 25 and 25.  Patient was found to be positive for COVID-19.  Chest x-ray was completed showing a slightly enlarged cardiac silhouette but no acute cardiopulmonary process.  Other lab work showed sodium of 136, potassium 3.7, creatinine of 1.10.  His ALT and AST were slightly elevated at 117 and 75 respectively.  Hemoglobin is 13.0.  CT of the abdomen pelvis showed minimal mild ascites, small bilateral pleural effusions, and  diverticulosis of the colon without evidence for acute diverticulitis.  Patient was started on a Cardizem drip and was admitted to the hospital for further assessment and management.     Review of Systems   Cardiovascular:  Positive for dyspnea on exertion and irregular heartbeat. Negative for chest pain and leg swelling.   All other systems reviewed and are negative.        Last Recorded Vitals:  Vitals:    01/09/24 0720 01/09/24 0725 01/09/24 0730 01/09/24 0800   BP:    (!) 123/100   BP Location:       Patient Position:       Pulse: (!) 117 107 (!) 117 (!) 120    Resp: 16 17 20 19   Temp:       TempSrc:       SpO2:    98%   Weight:       Height:           Last Labs:  CBC - 1/9/2024:  6:32 AM  5.1 11.8 77    34.3      CMP - 1/9/2024:  6:32 AM  8.7 6.9 75 --- 1.8   _ 4.2 117 103      PTT - No results in last year.  _   _ _     Hemoglobin A1C   Date/Time Value Ref Range Status   09/03/2021 10:02 AM 5.7 4.0 - 6.0 % Final     Comment:     Hemoglobin A1C levels are related to mean blood glucose during the   preceding 2-3 months. The relationship table below may be used as a   general guide. Each 1% increase in HGB A1C is a reflection of an   increase in mean glucose of approximately 30 mg/dl.   Reference: [employer], volume 29, supplement 1 Jan. 2006                        HGB A1C ................. Approx. Mean Glucose   _______________________________________________   6%   ...............................  120 mg/dl   7%   ...............................  150 mg/dl   8%   ...............................  180 mg/dl   9%   ...............................  210 mg/dl   10%  ...............................  240 mg/dl  Performed at [address] [address]     10/22/2018 04:48 PM 5.6 4.0 - 6.0 % Final     Comment:     Hemoglobin A1C levels are related to mean blood glucose during the   preceding 2-3 months. The relationship table below may be used as a   general guide. Each 1% increase in HGB A1C is a reflection of an   increase in mean glucose of approximately 30 mg/dl.   Reference: Diabetes [employer], volume 29, supplement 1 Jan. 2006                        HGB A1C ................. Approx. Mean Glucose   _______________________________________________   6%   ...............................  120 mg/dl   7%   ...............................  150 mg/dl   8%   ...............................  180 mg/dl   9%   ...............................  210 mg/dl   10%  ...............................  240 mg/dl  Performed at [address] [address]    "    LDL Calculated   Date/Time Value Ref Range Status   09/13/2023 12:48 PM 80 65 - 130 MG/DL Final   09/20/2022 03:53  65 - 130 MG/DL Final   09/03/2021 10:02  (H) 65 - 130 MG/DL Final      Last I/O:  I/O last 3 completed shifts:  In: 205.5 (1.7 mL/kg) [I.V.:205.5 (1.7 mL/kg)]  Out: - (0 mL/kg)   Weight: 118 kg     Past Cardiology Tests (Last 3 Years):  EKG:  No results found for this or any previous visit from the past 1095 days.    Echo:  No results found for this or any previous visit from the past 1095 days.    Ejection Fractions:  No results found for: \"EF\"  Cath:  No results found for this or any previous visit from the past 1095 days.    Stress Test:  No results found for this or any previous visit from the past 1095 days.    Cardiac Imaging:  No results found for this or any previous visit from the past 1095 days.      Past Medical History:  He has a past medical history of Anxiety and Hypertension.    Past Surgical History:  He has a past surgical history that includes Foot Tendon Surgery (Right).      Social History:  He reports that he has never smoked. He has never used smokeless tobacco. He reports current alcohol use. No history on file for drug use.    Family History:  Family History   Problem Relation Name Age of Onset    Hypertension Mother          Allergies:  Patient has no known allergies.    Inpatient Medications:  Scheduled medications   Medication Dose Route Frequency    carvedilol  6.25 mg oral BID with meals    citalopram  10 mg oral Daily    folic acid  1 mg oral Daily    lisinopril  40 mg oral Daily    multivitamin with minerals  1 tablet oral Daily    perflutren lipid microspheres  0.5-10 mL of dilution intravenous Once in imaging    perflutren protein A microsphere  0.5 mL intravenous Once in imaging    sulfur hexafluoride microsphr  2 mL intravenous Once in imaging    [START ON 1/11/2024] thiamine  100 mg oral Daily    thiamine  100 mg intravenous Daily     PRN medications "   Medication    acetaminophen    Or    acetaminophen    Or    acetaminophen    LORazepam    Or    LORazepam    Or    LORazepam    melatonin    polyethylene glycol     Continuous Medications   Medication Dose Last Rate     Outpatient Medications:  Current Outpatient Medications   Medication Instructions    citalopram (CELEXA) 10 mg, oral, Daily    folic acid (Folvite) 1 mg tablet oral, Daily    lisinopril 40 mg, oral, Daily       Physical Exam  Cardiovascular:      Rate and Rhythm: Tachycardia present. Rhythm irregular.      Heart sounds: No murmur heard.     No friction rub. No gallop.   Pulmonary:      Effort: Pulmonary effort is normal.      Breath sounds: Normal breath sounds. No wheezing, rhonchi or rales.   Abdominal:      General: Bowel sounds are normal.   Musculoskeletal:      Right lower leg: No edema.      Left lower leg: No edema.   Skin:     General: Skin is warm and dry.   Neurological:      Mental Status: He is alert and oriented to person, place, and time.           Assessment/Plan   New onset atrial fibrillation with rapid ventricular response  Dyspnea on Exertion  Covid-19 Infection  Hypertension  Alcohol Abuse    Impression and Plan: Patient presented the emergency department with worsening shortness of breath over the last week.  Patient reports shortness of breath with exertion as well as even while resting in bed.  Denies chest pain, pressure.  Reports occasional palpitations.  Patient presented to the emergency department was found to be in a rapid atrial fibrillation with a ventricular rate of 153.  Patient was also found to be COVID-positive.  Troponin levels were elevated but flat at 28, 25 and 25.  Patient does have a history of alcohol abuse and states that he has been doing a home detox since last Wednesday. On exam patient is alert and oriented.  He is breathing comfortably on room air with a pulse oximetry of 98%.  He remains in a rapid atrial fibrillation on telemetry with rates in the  120s.  Appears overall euvolemic to examination. His diltiazem drip was discontinued last evening due to hypotension.  An echocardiogram has been ordered to be completed this morning. I have ordered a one time push of IV digoxin 125 mcg to be administered for his rapid atrial fibrillation.  Additionally this patient's [id number] score is rather low at 1 and I do not believe the initiation of oral anticoagulation is necessary at this time.  Would suspect patient's new onset atrial fibrillation with rapid ventricular response may be the result of alcohol withdrawal. Will continue to follow.        Code Status:  Full Code    I spent 60 minutes in the professional and overall care of this patient.        Lianna العراقي, APRN-CNP

## 2024-01-09 NOTE — ED NOTES
Started cardizem drip at 5mg/hr. HR is 111. Care plan ongoing      Yessica Quinteros, RN  01/09/24 3446

## 2024-01-09 NOTE — PROGRESS NOTES
"Justo Whitehead is a 43 y.o. male on day 0 of admission presenting with rapid atrial fibrillation.     Subjective   He has no acute complaints, he was awake and alert.   His Cardizem drip was turned off last night after he reportedly became hypotensive.  He was seen by cardiology and received IV digoxin 125 mcg x 2.   He has become tachycardic again with heart rates between the 130s and 150s.  His wife was at the bedside.  He was still boarding in the emergency department.     Objective     Physical Exam  General: awake, alert, oriented, responsive  Cardiovascular: regular, normal S1 and S2  Lungs: good air entry bilaterally, clear to auscultation  Abdomen: soft, nontender, bowel sounds present, normoactive  Extremities: no peripheral cyanosis, no pedal edema  Neuro: alert, oriented x 3, no focal weakness      Last Recorded Vitals  Blood pressure (!) 132/98, pulse (!) 122, temperature 36.5 °C (97.7 °F), temperature source Oral, resp. rate 20, height 1.803 m (5' 11\"), weight 118 kg (260 lb 2.3 oz), SpO2 98 %.  Intake/Output last 3 Shifts:  I/O last 3 completed shifts:  In: 205.5 (1.7 mL/kg) [I.V.:205.5 (1.7 mL/kg)]  Out: - (0 mL/kg)   Weight: 118 kg     Relevant Results  Lab Results   Component Value Date    WBC 5.1 01/09/2024    HGB 11.8 (L) 01/09/2024    HCT 34.3 (L) 01/09/2024    MCV 93 01/09/2024    PLT 77 (L) 01/09/2024     Lab Results   Component Value Date    GLUCOSE 84 01/09/2024    CALCIUM 8.7 01/09/2024     01/09/2024    K 3.6 01/09/2024    CO2 22 (L) 01/09/2024     01/09/2024    BUN 18 01/09/2024    CREATININE 0.90 01/09/2024         Assessment/Plan     Atrial fibrillation with rapid ventricular response  Continue oral carvedilol  Cardiology following     Dyspnea on exertion  He has an enlarged cardiac silhouette on chest x-ray with dyspnea on exertion  2D echocardiogram to evaluate left ventricular function     Bilateral pleural effusions  Small bilateral pleural effusions and mild ascites " with normal appearance of the liver.  Concern for congestive heart failure.       COVID-19  He is not hypoxic and does not require specific COVID-19 therapy     Hypertension  On lisinopril    His heart rate is again uncontrolled. A repeat dose of digoxin was ordered by cardiology  Decrease lisinopril to allow uptitration of beta blocker.   2D echo report is now available and showed an LV EF of 40 to 45%    Jaspreet Robertson MD

## 2024-01-10 ENCOUNTER — APPOINTMENT (OUTPATIENT)
Dept: CARDIOLOGY | Facility: HOSPITAL | Age: 44
DRG: 308 | End: 2024-01-10
Payer: COMMERCIAL

## 2024-01-10 LAB
ANION GAP SERPL CALC-SCNC: 12 MMOL/L
BUN SERPL-MCNC: 16 MG/DL (ref 8–25)
CALCIUM SERPL-MCNC: 8.7 MG/DL (ref 8.5–10.4)
CHLORIDE SERPL-SCNC: 101 MMOL/L (ref 97–107)
CO2 SERPL-SCNC: 26 MMOL/L (ref 24–31)
CREAT SERPL-MCNC: 0.8 MG/DL (ref 0.4–1.6)
EGFRCR SERPLBLD CKD-EPI 2021: >90 ML/MIN/1.73M*2
GLUCOSE SERPL-MCNC: 133 MG/DL (ref 65–99)
POTASSIUM SERPL-SCNC: 3.2 MMOL/L (ref 3.4–5.1)
SODIUM SERPL-SCNC: 139 MMOL/L (ref 133–145)
TSH SERPL DL<=0.05 MIU/L-ACNC: 3.22 MIU/L (ref 0.27–4.2)

## 2024-01-10 PROCEDURE — 2500000001 HC RX 250 WO HCPCS SELF ADMINISTERED DRUGS (ALT 637 FOR MEDICARE OP): Performed by: INTERNAL MEDICINE

## 2024-01-10 PROCEDURE — 84443 ASSAY THYROID STIM HORMONE: CPT | Performed by: INTERNAL MEDICINE

## 2024-01-10 PROCEDURE — 80048 BASIC METABOLIC PNL TOTAL CA: CPT | Performed by: INTERNAL MEDICINE

## 2024-01-10 PROCEDURE — 99232 SBSQ HOSP IP/OBS MODERATE 35: CPT

## 2024-01-10 PROCEDURE — 2060000001 HC INTERMEDIATE ICU ROOM DAILY

## 2024-01-10 PROCEDURE — 2500000004 HC RX 250 GENERAL PHARMACY W/ HCPCS (ALT 636 FOR OP/ED): Performed by: INTERNAL MEDICINE

## 2024-01-10 PROCEDURE — 36415 COLL VENOUS BLD VENIPUNCTURE: CPT | Performed by: INTERNAL MEDICINE

## 2024-01-10 PROCEDURE — 93005 ELECTROCARDIOGRAM TRACING: CPT

## 2024-01-10 RX ORDER — POTASSIUM CHLORIDE 20 MEQ/1
40 TABLET, EXTENDED RELEASE ORAL ONCE
Status: COMPLETED | OUTPATIENT
Start: 2024-01-10 | End: 2024-01-10

## 2024-01-10 RX ADMIN — CARVEDILOL 12.5 MG: 12.5 TABLET, FILM COATED ORAL at 16:34

## 2024-01-10 RX ADMIN — Medication 1 TABLET: at 09:18

## 2024-01-10 RX ADMIN — ACETAMINOPHEN 650 MG: 325 TABLET ORAL at 16:34

## 2024-01-10 RX ADMIN — POTASSIUM CHLORIDE 40 MEQ: 1500 TABLET, EXTENDED RELEASE ORAL at 09:13

## 2024-01-10 RX ADMIN — CARVEDILOL 12.5 MG: 12.5 TABLET, FILM COATED ORAL at 09:12

## 2024-01-10 RX ADMIN — LISINOPRIL 20 MG: 20 TABLET ORAL at 09:18

## 2024-01-10 RX ADMIN — FOLIC ACID 1 MG: 1 TABLET ORAL at 09:18

## 2024-01-10 RX ADMIN — THIAMINE HYDROCHLORIDE 100 MG: 100 INJECTION, SOLUTION INTRAMUSCULAR; INTRAVENOUS at 09:18

## 2024-01-10 RX ADMIN — Medication 10 MG/HR: at 05:51

## 2024-01-10 RX ADMIN — ACETAMINOPHEN 650 MG: 325 TABLET ORAL at 00:19

## 2024-01-10 RX ADMIN — CITALOPRAM HYDROBROMIDE 10 MG: 10 TABLET ORAL at 09:18

## 2024-01-10 SDOH — SOCIAL STABILITY: SOCIAL INSECURITY: WERE YOU ABLE TO COMPLETE ALL THE BEHAVIORAL HEALTH SCREENINGS?: YES

## 2024-01-10 SDOH — SOCIAL STABILITY: SOCIAL INSECURITY: HAVE YOU HAD THOUGHTS OF HARMING ANYONE ELSE?: NO

## 2024-01-10 ASSESSMENT — LIFESTYLE VARIABLES
NAUSEA AND VOMITING: NO NAUSEA AND NO VOMITING
AUDIT-C TOTAL SCORE: 8
TOTAL SCORE: 2
VISUAL DISTURBANCES: NOT PRESENT
NAUSEA AND VOMITING: NO NAUSEA AND NO VOMITING
TREMOR: NO TREMOR
AGITATION: NORMAL ACTIVITY
BLOOD PRESSURE: 138/107
ORIENTATION AND CLOUDING OF SENSORIUM: ORIENTED AND CAN DO SERIAL ADDITIONS
AGITATION: NORMAL ACTIVITY
PAROXYSMAL SWEATS: NO SWEAT VISIBLE
HEADACHE, FULLNESS IN HEAD: NOT PRESENT
PULSE: 85
SKIP TO QUESTIONS 9-10: 0
PAROXYSMAL SWEATS: NO SWEAT VISIBLE
AUDITORY DISTURBANCES: NOT PRESENT
TREMOR: NO TREMOR
HOW MANY STANDARD DRINKS CONTAINING ALCOHOL DO YOU HAVE ON A TYPICAL DAY: 5 OR 6
VISUAL DISTURBANCES: NOT PRESENT
TREMOR: NO TREMOR
TOTAL SCORE: 2
TOTAL SCORE: 0
ORIENTATION AND CLOUDING OF SENSORIUM: ORIENTED AND CAN DO SERIAL ADDITIONS
AUDIT-C TOTAL SCORE: 8
ANXIETY: NO ANXIETY, AT EASE
HEADACHE, FULLNESS IN HEAD: MILD
HEADACHE, FULLNESS IN HEAD: VERY MILD
NAUSEA AND VOMITING: MILD NAUSEA WITH NO VOMITING
ANXIETY: NO ANXIETY, AT EASE
AUDITORY DISTURBANCES: NOT PRESENT
VISUAL DISTURBANCES: NOT PRESENT
PULSE: 96
HOW OFTEN DO YOU HAVE 6 OR MORE DRINKS ON ONE OCCASION: MONTHLY
PAROXYSMAL SWEATS: NO SWEAT VISIBLE
HOW OFTEN DO YOU HAVE A DRINK CONTAINING ALCOHOL: 4 OR MORE TIMES A WEEK
BLOOD PRESSURE: 126/93
ANXIETY: NO ANXIETY, AT EASE
AUDITORY DISTURBANCES: NOT PRESENT
AGITATION: NORMAL ACTIVITY
ORIENTATION AND CLOUDING OF SENSORIUM: ORIENTED AND CAN DO SERIAL ADDITIONS

## 2024-01-10 ASSESSMENT — PAIN - FUNCTIONAL ASSESSMENT
PAIN_FUNCTIONAL_ASSESSMENT: 0-10

## 2024-01-10 ASSESSMENT — COGNITIVE AND FUNCTIONAL STATUS - GENERAL
MOBILITY SCORE: 24
DAILY ACTIVITIY SCORE: 24
MOBILITY SCORE: 24
DAILY ACTIVITIY SCORE: 24

## 2024-01-10 ASSESSMENT — PAIN SCALES - GENERAL
PAINLEVEL_OUTOF10: 2
PAINLEVEL_OUTOF10: 0 - NO PAIN
PAINLEVEL_OUTOF10: 0 - NO PAIN
PAINLEVEL_OUTOF10: 6
PAINLEVEL_OUTOF10: 0 - NO PAIN
PAINLEVEL_OUTOF10: 0 - NO PAIN

## 2024-01-10 ASSESSMENT — ACTIVITIES OF DAILY LIVING (ADL): LACK_OF_TRANSPORTATION: NO

## 2024-01-10 ASSESSMENT — PATIENT HEALTH QUESTIONNAIRE - PHQ9
2. FEELING DOWN, DEPRESSED OR HOPELESS: NOT AT ALL
1. LITTLE INTEREST OR PLEASURE IN DOING THINGS: SEVERAL DAYS
SUM OF ALL RESPONSES TO PHQ9 QUESTIONS 1 & 2: 1

## 2024-01-10 ASSESSMENT — PAIN DESCRIPTION - LOCATION: LOCATION: HEAD

## 2024-01-10 NOTE — NURSING NOTE
Assumed care of pt, pt is lying in bed, HR is 76 afib on tele, pt is on isolations precautions, Cardizem gtt running at 5, bedside shift report given by AMY Fierro, bed locked and lowered, call light w/in reach.

## 2024-01-10 NOTE — NURSING NOTE
0994 Patient arrived from ED on monitor with RN at bedside. Cardizem gtt confirmed, see MAR. Patient is awake and alert, oriented to room, plan of care, policies/procedures. Patient in isolation per orders, special droplet. All needs met, call light within reach. Will continue to monitor.

## 2024-01-10 NOTE — PROGRESS NOTES
"Justo Whitehead is a 43 y.o. male on day 1 of admission presenting with rapid atrial fibrillation.     Subjective   He has no acute complaints, he was awake and alert.   He was tachycardic yesterday evening even after he received a third dose of IV digoxin.  His dose of carvedilol was increased to 12.5 mg twice daily and he was restarted on a Cardizem drip.  He remains on the Cardizem drip at a rate of 5 mg/hour      Objective     Physical Exam  General: awake, alert, oriented, responsive  Cardiovascular: regular, normal S1 and S2  Lungs: good air entry bilaterally, clear to auscultation  Abdomen: soft, nontender, bowel sounds present, normoactive  Extremities: no peripheral cyanosis, no pedal edema  Neuro: alert, oriented x 3, no focal weakness      Last Recorded Vitals  Blood pressure 112/89, pulse 81, temperature 36 °C (96.8 °F), resp. rate 16, height 1.803 m (5' 11\"), weight 118 kg (260 lb 2.3 oz), SpO2 95 %.  Intake/Output last 3 Shifts:  I/O last 3 completed shifts:  In: 411.8 (3.5 mL/kg) [P.O.:100; I.V.:311.8 (2.6 mL/kg)]  Out: 100 (0.8 mL/kg) [Urine:100 (0 mL/kg/hr)]  Weight: 118 kg     Relevant Results  Lab Results   Component Value Date    WBC 5.1 01/09/2024    HGB 11.8 (L) 01/09/2024    HCT 34.3 (L) 01/09/2024    MCV 93 01/09/2024    PLT 77 (L) 01/09/2024     Lab Results   Component Value Date    GLUCOSE 133 (H) 01/10/2024    CALCIUM 8.7 01/10/2024     01/10/2024    K 3.2 (L) 01/10/2024    CO2 26 01/10/2024     01/10/2024    BUN 16 01/10/2024    CREATININE 0.80 01/10/2024         Assessment/Plan     Atrial fibrillation with rapid ventricular response  Continue oral carvedilol  Cardiology following     Systolic heart failure  EF 40 to 50% on 2D echo done     Bilateral pleural effusions  Small bilateral pleural effusions and mild ascites with normal appearance of the liver.  Concern for congestive heart failure.       COVID-19  He is not hypoxic and does not require specific COVID-19 " therapy     Hypertension  On lisinopril    Continue the increased dose of carvedilol. Wean off cardizem drip as tolerated.     Jaspreet Robertson MD

## 2024-01-10 NOTE — CARE PLAN
Problem: Pain - Adult  Goal: Verbalizes/displays adequate comfort level or baseline comfort level  Outcome: Progressing     Problem: Safety - Adult  Goal: Free from fall injury  Outcome: Progressing     Problem: Discharge Planning  Goal: Discharge to home or other facility with appropriate resources  Outcome: Progressing     Problem: Chronic Conditions and Co-morbidities  Goal: Patient's chronic conditions and co-morbidity symptoms are monitored and maintained or improved  Outcome: Progressing     Problem: Fall/Injury  Goal: Not fall by end of shift  Outcome: Progressing  Goal: Be free from injury by end of the shift  Outcome: Progressing  Goal: Verbalize understanding of personal risk factors for fall in the hospital  Outcome: Progressing  Goal: Verbalize understanding of risk factor reduction measures to prevent injury from fall in the home  Outcome: Progressing     Problem: Arrythmia/Dysrhythmia  Goal: Promote self management  Outcome: Progressing  Goal: Verbalize understanding of procedures/devices  Outcome: Progressing  Goal: Vital signs return to baseline  Outcome: Progressing   The patient's goals for the shift include rest    The clinical goals for the shift include vital signs stable

## 2024-01-10 NOTE — ED NOTES
Patient transferred in hospital bed. Patient resting at this time.      Natalie Weeks RN  01/09/24 2028

## 2024-01-10 NOTE — PROGRESS NOTES
"Justo Whitehead is a 43 y.o. male on day 1 of admission presenting with Hypertensive urgency.    Subjective   Patient reports feeling much better this morning.  Resting comfortably in bed with wife at bedside.       Objective     Physical Exam  Cardiovascular:      Rate and Rhythm: Normal rate. Rhythm irregular.      Heart sounds: No murmur heard.     No friction rub. No gallop.   Pulmonary:      Effort: Pulmonary effort is normal.      Breath sounds: Normal breath sounds. No wheezing, rhonchi or rales.   Abdominal:      General: Bowel sounds are normal.   Musculoskeletal:      Right lower leg: No edema.      Left lower leg: No edema.   Skin:     General: Skin is warm and dry.   Neurological:      Mental Status: He is alert and oriented to person, place, and time.   Psychiatric:         Behavior: Behavior normal.         Last Recorded Vitals  Blood pressure (!) 126/92, pulse 83, temperature 36.2 °C (97.2 °F), temperature source Temporal, resp. rate 16, height 1.803 m (5' 11\"), weight 118 kg (260 lb 2.3 oz), SpO2 95 %.  Intake/Output last 3 Shifts:  I/O last 3 completed shifts:  In: 411.8 (3.5 mL/kg) [P.O.:100; I.V.:311.8 (2.6 mL/kg)]  Out: 100 (0.8 mL/kg) [Urine:100 (0 mL/kg/hr)]  Weight: 118 kg     Relevant Results  Results for orders placed or performed during the hospital encounter of 01/08/24 (from the past 24 hour(s))   Transthoracic Echo (TTE) Complete   Result Value Ref Range    AV pk roman 1.03     LVOT diam 2.00     RVSP 38.8     LVIDd 5.69     AV pk grad 4.2     Aortic Valve Area by Continuity of Peak Velocity 2.26     LV A4C EF 41.2    Thyroid Stimulating Hormone   Result Value Ref Range    Thyroid Stimulating Hormone 2.76 0.27 - 4.20 mIU/L   TSH with reflex to Free T4 if abnormal   Result Value Ref Range    Thyroid Stimulating Hormone 3.22 0.27 - 4.20 mIU/L   Basic Metabolic Panel   Result Value Ref Range    Glucose 133 (H) 65 - 99 mg/dL    Sodium 139 133 - 145 mmol/L    Potassium 3.2 (L) 3.4 - 5.1 " mmol/L    Chloride 101 97 - 107 mmol/L    Bicarbonate 26 24 - 31 mmol/L    Urea Nitrogen 16 8 - 25 mg/dL    Creatinine 0.80 0.40 - 1.60 mg/dL    eGFR >90 >60 mL/min/1.73m*2    Calcium 8.7 8.5 - 10.4 mg/dL    Anion Gap 12 <=19 mmol/L             Assessment/Plan   Principal Problem:    Hypertensive urgency  Active Problems:    Hypertension, unspecified type  New onset atrial fibrillation with rapid ventricular response  Dyspnea on Exertion  Covid-19 Infection  Hypertension  Alcohol Abuse     Impression and Plan: Patient presented the emergency department with worsening shortness of breath over the last week.  Patient reports shortness of breath with exertion as well as even while resting in bed.  Denies chest pain, pressure.  Reports occasional palpitations.  Patient presented to the emergency department was found to be in a rapid atrial fibrillation with a ventricular rate of 153.  Patient was also found to be COVID-positive.  Troponin levels were elevated but flat at 28, 25 and 25.  Patient does have a history of alcohol abuse and states that he has been doing a home detox since last Wednesday. On exam patient is alert and oriented.  He is breathing comfortably on room air with a pulse oximetry of 98%.  He remains in a rapid atrial fibrillation on telemetry with rates in the 120s.  Appears overall euvolemic to examination. His diltiazem drip was discontinued last evening due to hypotension.  An echocardiogram has been ordered to be completed this morning. I have ordered a one time push of IV digoxin 125 mcg to be administered for his rapid atrial fibrillation.  Additionally this patient's [id number] score is rather low at 1 and I do not believe the initiation of oral anticoagulation is necessary at this time.  Would suspect patient's new onset atrial fibrillation with rapid ventricular response may be the result of alcohol withdrawal. Will continue to follow.     1/10: Reports feeling much better this morning.  He  states he was able to get some sleep last night.  Denies chest pain, pressure or palpitations.  States his shortness of breath is improved.  Patient was placed on a Cardizem drip last night which is still running at 5 mg/h.  He remains in atrial fibrillation on telemetry but is rate controlled at this time.  Blood pressure is normal overall with his last recorded at 126/92.  Breathing comfortably on room air with pulse oximetry of 95%.  Patient appears overall euvolemic on examination.  This morning shows sodium 139, potassium 3.2, creatinine 0.80.  Potassium replacement has been ordered for this morning.  Echocardiogram completed yesterday showed a mildly decreased ejection fraction of 40 to 45% as well as global hypokinesis of the left ventricle with minor regional variations.  Continue patient on 20 mg of lisinopril daily for blood pressure.  Carvedilol has been increased to 12.5 mg twice daily by mouth which I am agreeable to.  Will continue to monitor telemetry but suspect that patient can be taken off the Cardizem drip at some point today.  Will continue to follow.           Lianna العراقي, MATEO-CNP

## 2024-01-10 NOTE — PROGRESS NOTES
Justo Whitehead is a 43 y.o. male on day 1 of admission presenting with Hypertensive urgency.    Per chart review pt independent from home last [facility] score 24 and has no needs from case management. Please consult if needs arise     aDphne Duran RN

## 2024-01-11 LAB
ANION GAP SERPL CALC-SCNC: 11 MMOL/L
ANNOTATION COMMENT IMP: NORMAL
BUN SERPL-MCNC: 10 MG/DL (ref 8–25)
CALCIUM SERPL-MCNC: 8.9 MG/DL (ref 8.5–10.4)
CHLORIDE SERPL-SCNC: 102 MMOL/L (ref 97–107)
CO2 SERPL-SCNC: 25 MMOL/L (ref 24–31)
CREAT SERPL-MCNC: 0.8 MG/DL (ref 0.4–1.6)
EGFRCR SERPLBLD CKD-EPI 2021: >90 ML/MIN/1.73M*2
GLUCOSE SERPL-MCNC: 109 MG/DL (ref 65–99)
ONION IGE QN: 0.13 KU/L
POTASSIUM SERPL-SCNC: 4 MMOL/L (ref 3.4–5.1)
SODIUM SERPL-SCNC: 138 MMOL/L (ref 133–145)

## 2024-01-11 PROCEDURE — 80048 BASIC METABOLIC PNL TOTAL CA: CPT | Performed by: INTERNAL MEDICINE

## 2024-01-11 PROCEDURE — 2500000001 HC RX 250 WO HCPCS SELF ADMINISTERED DRUGS (ALT 637 FOR MEDICARE OP): Performed by: INTERNAL MEDICINE

## 2024-01-11 PROCEDURE — 36415 COLL VENOUS BLD VENIPUNCTURE: CPT | Performed by: INTERNAL MEDICINE

## 2024-01-11 PROCEDURE — 2500000004 HC RX 250 GENERAL PHARMACY W/ HCPCS (ALT 636 FOR OP/ED): Performed by: INTERNAL MEDICINE

## 2024-01-11 PROCEDURE — 99232 SBSQ HOSP IP/OBS MODERATE 35: CPT | Performed by: NURSE PRACTITIONER

## 2024-01-11 PROCEDURE — 2060000001 HC INTERMEDIATE ICU ROOM DAILY

## 2024-01-11 RX ORDER — CARVEDILOL 25 MG/1
25 TABLET ORAL
Status: DISCONTINUED | OUTPATIENT
Start: 2024-01-11 | End: 2024-01-13 | Stop reason: HOSPADM

## 2024-01-11 RX ADMIN — Medication 100 MG: at 19:40

## 2024-01-11 RX ADMIN — CARVEDILOL 12.5 MG: 12.5 TABLET, FILM COATED ORAL at 08:50

## 2024-01-11 RX ADMIN — CARVEDILOL 25 MG: 25 TABLET, FILM COATED ORAL at 17:13

## 2024-01-11 RX ADMIN — LISINOPRIL 20 MG: 20 TABLET ORAL at 08:50

## 2024-01-11 RX ADMIN — CITALOPRAM HYDROBROMIDE 10 MG: 10 TABLET ORAL at 08:50

## 2024-01-11 RX ADMIN — Medication 5 MG/HR: at 04:25

## 2024-01-11 RX ADMIN — FOLIC ACID 1 MG: 1 TABLET ORAL at 08:50

## 2024-01-11 RX ADMIN — ACETAMINOPHEN 650 MG: 325 TABLET ORAL at 20:12

## 2024-01-11 RX ADMIN — ACETAMINOPHEN 650 MG: 325 TABLET ORAL at 04:36

## 2024-01-11 RX ADMIN — Medication 1 TABLET: at 08:50

## 2024-01-11 ASSESSMENT — COGNITIVE AND FUNCTIONAL STATUS - GENERAL
DAILY ACTIVITIY SCORE: 24
DAILY ACTIVITIY SCORE: 24
MOBILITY SCORE: 24
MOBILITY SCORE: 24

## 2024-01-11 ASSESSMENT — LIFESTYLE VARIABLES
VISUAL DISTURBANCES: NOT PRESENT
AGITATION: NORMAL ACTIVITY
HEADACHE, FULLNESS IN HEAD: NOT PRESENT
TREMOR: NO TREMOR
ANXIETY: NO ANXIETY, AT EASE
PAROXYSMAL SWEATS: NO SWEAT VISIBLE
NAUSEA AND VOMITING: NO NAUSEA AND NO VOMITING
TOTAL SCORE: 0
AUDITORY DISTURBANCES: NOT PRESENT
ORIENTATION AND CLOUDING OF SENSORIUM: ORIENTED AND CAN DO SERIAL ADDITIONS

## 2024-01-11 ASSESSMENT — PAIN SCALES - GENERAL
PAINLEVEL_OUTOF10: 3
PAINLEVEL_OUTOF10: 0 - NO PAIN
PAINLEVEL_OUTOF10: 2
PAINLEVEL_OUTOF10: 0 - NO PAIN
PAINLEVEL_OUTOF10: 0 - NO PAIN

## 2024-01-11 ASSESSMENT — PAIN - FUNCTIONAL ASSESSMENT
PAIN_FUNCTIONAL_ASSESSMENT: 0-10

## 2024-01-11 ASSESSMENT — PAIN DESCRIPTION - LOCATION: LOCATION: HEAD

## 2024-01-11 ASSESSMENT — PAIN DESCRIPTION - DESCRIPTORS
DESCRIPTORS: HEADACHE
DESCRIPTORS: HEADACHE

## 2024-01-11 NOTE — CARE PLAN
The patient's goals for the shift include rest    The clinical goals for the shift include pt to remain free of falls and dc CIWA      Problem: Pain - Adult  Goal: Verbalizes/displays adequate comfort level or baseline comfort level  Outcome: Progressing     Problem: Safety - Adult  Goal: Free from fall injury  Outcome: Progressing     Problem: Discharge Planning  Goal: Discharge to home or other facility with appropriate resources  Outcome: Progressing     Problem: Chronic Conditions and Co-morbidities  Goal: Patient's chronic conditions and co-morbidity symptoms are monitored and maintained or improved  Outcome: Progressing     Problem: Fall/Injury  Goal: Not fall by end of shift  Outcome: Progressing  Goal: Be free from injury by end of the shift  Outcome: Progressing  Goal: Verbalize understanding of personal risk factors for fall in the hospital  Outcome: Progressing  Goal: Verbalize understanding of risk factor reduction measures to prevent injury from fall in the home  Outcome: Progressing     Problem: Arrythmia/Dysrhythmia  Goal: Promote self management  Outcome: Progressing  Goal: Verbalize understanding of procedures/devices  Outcome: Progressing  Goal: Vital signs return to baseline  Outcome: Progressing

## 2024-01-11 NOTE — CARE PLAN
The clinical goals for the shift include maintain heart rate below between 60-80  He was unable to achieve this goal, his diltiazem had to be increased overnight as a result of his heart rate remaining above the parameters.  With the increase his blood pressure remained stable and heart rate began to decrease to the desired range.  Over the shift, the patient did make progress toward the following goals:      Problem: Safety - Adult  Goal: Free from fall injury  Outcome: Progressing     Problem: Pain - Adult  Goal: Verbalizes/displays adequate comfort level or baseline comfort level  Outcome: Progressing     Problem: Arrythmia/Dysrhythmia  Goal: Vital signs return to baseline  Outcome: Progressing     Problem: Arrythmia/Dysrhythmia  Goal: Promote self management  Outcome: Progressing     Problem: Fall/Injury  Goal: Verbalize understanding of risk factor reduction measures to prevent injury from fall in the home  Outcome: Progressing

## 2024-01-11 NOTE — NURSING NOTE
Assumed care of pt, pt on RA, pt awake no complaint of pain, bed low and locked call light within reach , cardizem drip @ 10mg

## 2024-01-11 NOTE — PROGRESS NOTES
"Justo Whitehead is a 43 y.o. male on day 2 of admission presenting with rapid atrial fibrillation.     Subjective   He has no acute complaints.  Azem drip was stopped today.  His heart rate is as high as 118/min. No shortness of breath.       Objective     Physical Exam  General: awake, alert, oriented, responsive  Cardiovascular: regular, normal S1 and S2  Lungs: good air entry bilaterally, clear to auscultation  Abdomen: soft, nontender, bowel sounds present, normoactive  Extremities: no peripheral cyanosis, no pedal edema  Neuro: alert, oriented x 3, no focal weakness      Last Recorded Vitals  Blood pressure (!) 122/97, pulse 85, temperature 36.2 °C (97.2 °F), temperature source Temporal, resp. rate 21, height 1.803 m (5' 11\"), weight 118 kg (260 lb 2.3 oz), SpO2 98 %.  Intake/Output last 3 Shifts:  I/O last 3 completed shifts:  In: 320.9 (2.7 mL/kg) [P.O.:100; I.V.:220.9 (1.9 mL/kg)]  Out: 100 (0.8 mL/kg) [Urine:100 (0 mL/kg/hr)]  Weight: 118 kg     Relevant Results  Lab Results   Component Value Date    WBC 5.1 01/09/2024    HGB 11.8 (L) 01/09/2024    HCT 34.3 (L) 01/09/2024    MCV 93 01/09/2024    PLT 77 (L) 01/09/2024     Lab Results   Component Value Date    GLUCOSE 109 (H) 01/11/2024    CALCIUM 8.9 01/11/2024     01/11/2024    K 4.0 01/11/2024    CO2 25 01/11/2024     01/11/2024    BUN 10 01/11/2024    CREATININE 0.80 01/11/2024     Scheduled medications  carvedilol, 12.5 mg, oral, BID with meals  citalopram, 10 mg, oral, Daily  folic acid, 1 mg, oral, Daily  lisinopril, 20 mg, oral, Daily  multivitamin with minerals, 1 tablet, oral, Daily  perflutren lipid microspheres, 0.5-10 mL of dilution, intravenous, Once in imaging  perflutren protein A microsphere, 0.5 mL, intravenous, Once in imaging  sulfur hexafluoride microsphr, 2 mL, intravenous, Once in imaging  thiamine, 100 mg, oral, Daily      Continuous medications     PRN medications  PRN medications: acetaminophen **OR** acetaminophen " **OR** acetaminophen, LORazepam **OR** LORazepam **OR** LORazepam, melatonin, polyethylene glycol        Assessment/Plan     Atrial fibrillation with rapid ventricular response  Continue oral carvedilol  Cardiology following     Systolic heart failure  EF 40 to 45% on 2D echo done 1/9     Bilateral pleural effusions  Small bilateral pleural effusions and mild ascites with normal appearance of the liver.  Concern for congestive heart failure.       COVID-19  He is not hypoxic and does not require specific COVID-19 therapy     Hypertension  On lisinopril    Continue carvedilol.  Increase dose to 25 mg twice daily.  Possibly starting anticoagulation per cardiology.  Anticipate discharge on 1/12    Jaspreet Robertson MD

## 2024-01-11 NOTE — PROGRESS NOTES
"Justo Whitehead is a 43 y.o. male on day 3 of admission presenting with Hypertensive urgency.    Subjective   No reports of chest pain or pressure, palpitations or feeling of rapid heart rate.  Now in a rate controlled atrial fibrillation.  Blood pressure is improving, most recent 122/99.        Objective     Physical Exam; deferred, patient is in coronavirus isolation.    Last Recorded Vitals  Blood pressure (!) 122/99, pulse 92, temperature 36.5 °C (97.7 °F), temperature source Temporal, resp. rate 18, height 1.803 m (5' 11\"), weight 118 kg (260 lb 2.3 oz), SpO2 97 %.  Intake/Output last 3 Shifts:  I/O last 3 completed shifts:  In: 320.9 (2.7 mL/kg) [P.O.:100; I.V.:220.9 (1.9 mL/kg)]  Out: 100 (0.8 mL/kg) [Urine:100 (0 mL/kg/hr)]  Weight: 118 kg     Relevant Results  Results for orders placed or performed during the hospital encounter of 01/08/24 (from the past 24 hour(s))   Basic Metabolic Panel   Result Value Ref Range    Glucose 109 (H) 65 - 99 mg/dL    Sodium 138 133 - 145 mmol/L    Potassium 4.0 3.4 - 5.1 mmol/L    Chloride 102 97 - 107 mmol/L    Bicarbonate 25 24 - 31 mmol/L    Urea Nitrogen 10 8 - 25 mg/dL    Creatinine 0.80 0.40 - 1.60 mg/dL    eGFR >90 >60 mL/min/1.73m*2    Calcium 8.9 8.5 - 10.4 mg/dL    Anion Gap 11 <=19 mmol/L         Assessment/Plan   Principal Problem:    Hypertensive urgency  Active Problems:    Hypertension, unspecified type    New onset atrial fibrillation with rapid ventricular response  Dyspnea on Exertion  Covid-19 Infection  Hypertension  Alcohol Abuse     Impression and Plan: Patient presented the emergency department with worsening shortness of breath over the last week.  Patient reports shortness of breath with exertion as well as even while resting in bed.  Denies chest pain, pressure.  Reports occasional palpitations.  Patient presented to the emergency department was found to be in a rapid atrial fibrillation with a ventricular rate of 153.  Patient was also found to " be COVID-positive.  Troponin levels were elevated but flat at 28, 25 and 25.  Patient does have a history of alcohol abuse and states that he has been doing a home detox since last Wednesday. On exam patient is alert and oriented.  He is breathing comfortably on room air with a pulse oximetry of 98%.  He remains in a rapid atrial fibrillation on telemetry with rates in the 120s.  Appears overall euvolemic to examination. His diltiazem drip was discontinued last evening due to hypotension.  An echocardiogram has been ordered to be completed this morning. I have ordered a one time push of IV digoxin 125 mcg to be administered for his rapid atrial fibrillation.  Additionally this patient's [id number] score is rather low at 1 and I do not believe the initiation of oral anticoagulation is necessary at this time.  Would suspect patient's new onset atrial fibrillation with rapid ventricular response may be the result of alcohol withdrawal. Will continue to follow.      1/10: Reports feeling much better this morning.  He states he was able to get some sleep last night.  Denies chest pain, pressure or palpitations.  States his shortness of breath is improved.  Patient was placed on a Cardizem drip last night which is still running at 5 mg/h.  He remains in atrial fibrillation on telemetry but is rate controlled at this time.  Blood pressure is normal overall with his last recorded at 126/92.  Breathing comfortably on room air with pulse oximetry of 95%.  Patient appears overall euvolemic on examination.  This morning shows sodium 139, potassium 3.2, creatinine 0.80.  Potassium replacement has been ordered for this morning.  Echocardiogram completed yesterday showed a mildly decreased ejection fraction of 40 to 45% as well as global hypokinesis of the left ventricle with minor regional variations.  Continue patient on 20 mg of lisinopril daily for blood pressure.  Carvedilol has been increased to 12.5 mg twice daily by  mouth which I am agreeable to.  Will continue to monitor telemetry but suspect that patient can be taken off the Cardizem drip at some point today.  Will continue to follow.    1/11: No significant changes over the past 24 hours.  No reports of palpitations, or chest pain.  Respiratory status continues to improve.  He continued on diltiazem drip overnight, this morning he remains in a rate controlled atrial fibrillation, will stop diltiazem drip.  Blood pressure is improving, most recent 122/99.  Exam was increased yesterday to 12.5 twice daily.  Would continue with this current dose.  Lisinopril now 20 mg daily.  Updated [id number] equals 2 noting reduced ejection fraction.  Anticoagulation is generally recommended, however the patient is noted to be an alcoholic who is attempting detox.  I do have concern for long-term anticoagulation in this patient.  Will discuss with Dr. Josue the plan going forward for anticoagulation.  Perhaps a reasonable alternative would be a aspirin 81 mg daily.  Will follow with you.     I spent 35 minutes in the professional and overall care of this patient.      Hernán Morrison, APRN-CNP

## 2024-01-12 LAB
ANION GAP SERPL CALC-SCNC: 11 MMOL/L
BUN SERPL-MCNC: 10 MG/DL (ref 8–25)
CALCIUM SERPL-MCNC: 8.8 MG/DL (ref 8.5–10.4)
CHLORIDE SERPL-SCNC: 103 MMOL/L (ref 97–107)
CO2 SERPL-SCNC: 26 MMOL/L (ref 24–31)
CREAT SERPL-MCNC: 0.8 MG/DL (ref 0.4–1.6)
EGFRCR SERPLBLD CKD-EPI 2021: >90 ML/MIN/1.73M*2
ERYTHROCYTE [DISTWIDTH] IN BLOOD BY AUTOMATED COUNT: 14.1 % (ref 11.5–14.5)
GLUCOSE SERPL-MCNC: 105 MG/DL (ref 65–99)
HCT VFR BLD AUTO: 36 % (ref 41–52)
HGB BLD-MCNC: 12.1 G/DL (ref 13.5–17.5)
MCH RBC QN AUTO: 31.8 PG (ref 26–34)
MCHC RBC AUTO-ENTMCNC: 33.6 G/DL (ref 32–36)
MCV RBC AUTO: 95 FL (ref 80–100)
NRBC BLD-RTO: 0 /100 WBCS (ref 0–0)
PLATELET # BLD AUTO: 120 X10*3/UL (ref 150–450)
POTASSIUM SERPL-SCNC: 4 MMOL/L (ref 3.4–5.1)
Q ONSET: 219 MS
QRS COUNT: 25 BEATS
QRS DURATION: 84 MS
QT INTERVAL: 304 MS
QTC CALCULATION(BAZETT): 485 MS
QTC FREDERICIA: 415 MS
R AXIS: 85 DEGREES
RBC # BLD AUTO: 3.8 X10*6/UL (ref 4.5–5.9)
SODIUM SERPL-SCNC: 140 MMOL/L (ref 133–145)
T AXIS: 69 DEGREES
T OFFSET: 371 MS
VENTRICULAR RATE: 153 BPM
WBC # BLD AUTO: 5 X10*3/UL (ref 4.4–11.3)

## 2024-01-12 PROCEDURE — 2500000001 HC RX 250 WO HCPCS SELF ADMINISTERED DRUGS (ALT 637 FOR MEDICARE OP): Performed by: INTERNAL MEDICINE

## 2024-01-12 PROCEDURE — 2500000004 HC RX 250 GENERAL PHARMACY W/ HCPCS (ALT 636 FOR OP/ED): Performed by: NURSE PRACTITIONER

## 2024-01-12 PROCEDURE — 99232 SBSQ HOSP IP/OBS MODERATE 35: CPT | Performed by: NURSE PRACTITIONER

## 2024-01-12 PROCEDURE — 2500000001 HC RX 250 WO HCPCS SELF ADMINISTERED DRUGS (ALT 637 FOR MEDICARE OP): Performed by: NURSE PRACTITIONER

## 2024-01-12 PROCEDURE — 80048 BASIC METABOLIC PNL TOTAL CA: CPT | Performed by: INTERNAL MEDICINE

## 2024-01-12 PROCEDURE — 85027 COMPLETE CBC AUTOMATED: CPT | Performed by: INTERNAL MEDICINE

## 2024-01-12 PROCEDURE — 2060000001 HC INTERMEDIATE ICU ROOM DAILY

## 2024-01-12 PROCEDURE — 36415 COLL VENOUS BLD VENIPUNCTURE: CPT | Performed by: INTERNAL MEDICINE

## 2024-01-12 RX ORDER — DIGOXIN 0.25 MG/ML
250 INJECTION INTRAMUSCULAR; INTRAVENOUS DAILY
Status: DISCONTINUED | OUTPATIENT
Start: 2024-01-12 | End: 2024-01-13

## 2024-01-12 RX ADMIN — Medication 100 MG: at 09:34

## 2024-01-12 RX ADMIN — CARVEDILOL 25 MG: 25 TABLET, FILM COATED ORAL at 17:19

## 2024-01-12 RX ADMIN — CARVEDILOL 25 MG: 25 TABLET, FILM COATED ORAL at 09:34

## 2024-01-12 RX ADMIN — CARVEDILOL 25 MG: 25 TABLET, FILM COATED ORAL at 04:04

## 2024-01-12 RX ADMIN — LISINOPRIL 20 MG: 20 TABLET ORAL at 09:34

## 2024-01-12 RX ADMIN — DIGOXIN 250 MCG: 0.25 INJECTION INTRAMUSCULAR; INTRAVENOUS at 10:52

## 2024-01-12 RX ADMIN — APIXABAN 5 MG: 5 TABLET, FILM COATED ORAL at 10:52

## 2024-01-12 RX ADMIN — Medication 1 TABLET: at 09:34

## 2024-01-12 RX ADMIN — APIXABAN 5 MG: 5 TABLET, FILM COATED ORAL at 23:35

## 2024-01-12 RX ADMIN — FOLIC ACID 1 MG: 1 TABLET ORAL at 09:34

## 2024-01-12 RX ADMIN — CITALOPRAM HYDROBROMIDE 10 MG: 10 TABLET ORAL at 09:34

## 2024-01-12 ASSESSMENT — COGNITIVE AND FUNCTIONAL STATUS - GENERAL
DAILY ACTIVITIY SCORE: 24
MOBILITY SCORE: 24

## 2024-01-12 ASSESSMENT — PAIN SCALES - GENERAL: PAINLEVEL_OUTOF10: 0 - NO PAIN

## 2024-01-12 ASSESSMENT — PAIN - FUNCTIONAL ASSESSMENT: PAIN_FUNCTIONAL_ASSESSMENT: 0-10

## 2024-01-12 NOTE — NURSING NOTE
Assumed care of patient from dayshift. Patient lying comfortably in bed at this time with no complaints of pain and or discomfort. Bed in lowest position and locked and call bell and personal belongings within reach. No further interventions at this time.

## 2024-01-12 NOTE — NURSING NOTE
Patient's heart rate maintaining between 112-150 BPM throughout the night; blood pressures hypertensive. Patient denies any symptoms. Overnight hospitalist contacted regarding the patient's heart rate and blood pressures. Physician said to give the patient a one-time extra dose of carvedilol for the increased heart rate. Medication given to patient and properly documented in MAR. Continuing to monitor patient's heart rate.

## 2024-01-12 NOTE — PROGRESS NOTES
"Justo Whitehead is a 43 y.o. male on day 3 of admission presenting with rapid atrial fibrillation.     Subjective   He has no acute complaints.    He received 250 mcg of IV digoxin today for uncontrolled heart rate.       Objective     Physical Exam  General: awake, alert, oriented, responsive  Cardiovascular: regular, normal S1 and S2  Lungs: good air entry bilaterally, clear to auscultation  Abdomen: soft, nontender, bowel sounds present, normoactive  Extremities: no peripheral cyanosis, no pedal edema  Neuro: alert, oriented x 3, no focal weakness      Last Recorded Vitals  Blood pressure (!) 137/111, pulse 97, temperature 35.6 °C (96.1 °F), temperature source Temporal, resp. rate 20, height 1.803 m (5' 11\"), weight 118 kg (260 lb 2.3 oz), SpO2 94 %.  Intake/Output last 3 Shifts:  I/O last 3 completed shifts:  In: 94.3 (0.8 mL/kg) [I.V.:94.3 (0.8 mL/kg)]  Out: - (0 mL/kg)   Weight: 118 kg     Relevant Results  Lab Results   Component Value Date    WBC 5.0 01/12/2024    HGB 12.1 (L) 01/12/2024    HCT 36.0 (L) 01/12/2024    MCV 95 01/12/2024     (L) 01/12/2024     Lab Results   Component Value Date    GLUCOSE 105 (H) 01/12/2024    CALCIUM 8.8 01/12/2024     01/12/2024    K 4.0 01/12/2024    CO2 26 01/12/2024     01/12/2024    BUN 10 01/12/2024    CREATININE 0.80 01/12/2024     Scheduled medications  apixaban, 5 mg, oral, q12h RAMIRO  carvedilol, 25 mg, oral, BID with meals  citalopram, 10 mg, oral, Daily  digoxin, 250 mcg, intravenous, Daily  folic acid, 1 mg, oral, Daily  lisinopril, 20 mg, oral, Daily  multivitamin with minerals, 1 tablet, oral, Daily  perflutren lipid microspheres, 0.5-10 mL of dilution, intravenous, Once in imaging  perflutren protein A microsphere, 0.5 mL, intravenous, Once in imaging  sulfur hexafluoride microsphr, 2 mL, intravenous, Once in imaging  thiamine, 100 mg, oral, Daily      Continuous medications     PRN medications  PRN medications: acetaminophen **OR** " acetaminophen **OR** acetaminophen, LORazepam **OR** LORazepam **OR** LORazepam, melatonin, polyethylene glycol        Assessment/Plan     Atrial fibrillation with rapid ventricular response  Continue oral carvedilol  Cardiology following  On anticoagulation with eilquis     Systolic heart failure  EF 40 to 45% on 2D echo done 1/9     COVID-19  He is not hypoxic and does not require specific COVID-19 therapy     Hypertension  On lisinopril    Continue carvedilol. Monitor heart rate after IV digoxin administration. Outpatient cardioversion is planned per cardiology    Jaspreet Robertson MD

## 2024-01-12 NOTE — CARE PLAN
Problem: Pain - Adult  Goal: Verbalizes/displays adequate comfort level or baseline comfort level  Outcome: Progressing     Problem: Safety - Adult  Goal: Free from fall injury  Outcome: Progressing     Problem: Discharge Planning  Goal: Discharge to home or other facility with appropriate resources  Outcome: Progressing     Problem: Chronic Conditions and Co-morbidities  Goal: Patient's chronic conditions and co-morbidity symptoms are monitored and maintained or improved  Outcome: Progressing     Problem: Fall/Injury  Goal: Not fall by end of shift  Outcome: Progressing  Goal: Be free from injury by end of the shift  Outcome: Progressing  Goal: Verbalize understanding of personal risk factors for fall in the hospital  Outcome: Progressing  Goal: Verbalize understanding of risk factor reduction measures to prevent injury from fall in the home  Outcome: Progressing     Problem: Arrythmia/Dysrhythmia  Goal: Promote self management  Outcome: Progressing  Goal: Verbalize understanding of procedures/devices  Outcome: Progressing  Goal: Vital signs return to baseline  Outcome: Progressing   The patient's goals for the shift include rest    The clinical goals for the shift include maintain normal HR    Over the shift, the patient did not make progress toward the following goals. Barriers to progression include n/a. Recommendations to address these barriers include n/a.

## 2024-01-12 NOTE — NURSING NOTE
Assumed care of pt, pt is awake in bed,  afib on tele, on RA, bedside shift report given by Eliu, bed locked and lowered, call light w/in reach.

## 2024-01-13 VITALS
BODY MASS INDEX: 36.42 KG/M2 | SYSTOLIC BLOOD PRESSURE: 127 MMHG | RESPIRATION RATE: 17 BRPM | HEIGHT: 71 IN | WEIGHT: 260.14 LBS | TEMPERATURE: 97.3 F | OXYGEN SATURATION: 100 % | HEART RATE: 114 BPM | DIASTOLIC BLOOD PRESSURE: 87 MMHG

## 2024-01-13 PROBLEM — I16.0 HYPERTENSIVE URGENCY: Status: RESOLVED | Noted: 2023-12-28 | Resolved: 2024-01-13

## 2024-01-13 PROBLEM — I48.91 ATRIAL FIBRILLATION (MULTI): Status: ACTIVE | Noted: 2024-01-13

## 2024-01-13 LAB
ANION GAP SERPL CALC-SCNC: 11 MMOL/L
BUN SERPL-MCNC: 10 MG/DL (ref 8–25)
CALCIUM SERPL-MCNC: 9.2 MG/DL (ref 8.5–10.4)
CHLORIDE SERPL-SCNC: 103 MMOL/L (ref 97–107)
CO2 SERPL-SCNC: 25 MMOL/L (ref 24–31)
CREAT SERPL-MCNC: 0.9 MG/DL (ref 0.4–1.6)
EGFRCR SERPLBLD CKD-EPI 2021: >90 ML/MIN/1.73M*2
GLUCOSE SERPL-MCNC: 100 MG/DL (ref 65–99)
HOLD SPECIMEN: NORMAL
POTASSIUM SERPL-SCNC: 4 MMOL/L (ref 3.4–5.1)
SODIUM SERPL-SCNC: 139 MMOL/L (ref 133–145)

## 2024-01-13 PROCEDURE — 2500000001 HC RX 250 WO HCPCS SELF ADMINISTERED DRUGS (ALT 637 FOR MEDICARE OP): Performed by: NURSE PRACTITIONER

## 2024-01-13 PROCEDURE — 36415 COLL VENOUS BLD VENIPUNCTURE: CPT | Performed by: INTERNAL MEDICINE

## 2024-01-13 PROCEDURE — 80048 BASIC METABOLIC PNL TOTAL CA: CPT | Performed by: INTERNAL MEDICINE

## 2024-01-13 PROCEDURE — 2500000001 HC RX 250 WO HCPCS SELF ADMINISTERED DRUGS (ALT 637 FOR MEDICARE OP): Performed by: INTERNAL MEDICINE

## 2024-01-13 PROCEDURE — 2500000004 HC RX 250 GENERAL PHARMACY W/ HCPCS (ALT 636 FOR OP/ED): Performed by: NURSE PRACTITIONER

## 2024-01-13 RX ORDER — LISINOPRIL 20 MG/1
20 TABLET ORAL DAILY
Qty: 30 TABLET | Refills: 2 | Status: SHIPPED | OUTPATIENT
Start: 2024-01-14 | End: 2024-03-08 | Stop reason: SDUPTHER

## 2024-01-13 RX ORDER — DIGOXIN 125 MCG
125 TABLET ORAL DAILY
Status: DISCONTINUED | OUTPATIENT
Start: 2024-01-14 | End: 2024-01-13 | Stop reason: HOSPADM

## 2024-01-13 RX ORDER — CARVEDILOL 25 MG/1
25 TABLET ORAL
Qty: 60 TABLET | Refills: 2 | Status: SHIPPED | OUTPATIENT
Start: 2024-01-13 | End: 2024-03-08 | Stop reason: SDUPTHER

## 2024-01-13 RX ORDER — DIGOXIN 125 MCG
125 TABLET ORAL DAILY
Qty: 30 TABLET | Refills: 2 | Status: SHIPPED | OUTPATIENT
Start: 2024-01-14 | End: 2024-04-21

## 2024-01-13 RX ADMIN — FOLIC ACID 1 MG: 1 TABLET ORAL at 09:38

## 2024-01-13 RX ADMIN — Medication 1 TABLET: at 09:38

## 2024-01-13 RX ADMIN — CITALOPRAM HYDROBROMIDE 10 MG: 10 TABLET ORAL at 09:38

## 2024-01-13 RX ADMIN — Medication 100 MG: at 09:38

## 2024-01-13 RX ADMIN — DIGOXIN 250 MCG: 0.25 INJECTION INTRAMUSCULAR; INTRAVENOUS at 09:38

## 2024-01-13 RX ADMIN — APIXABAN 5 MG: 5 TABLET, FILM COATED ORAL at 09:38

## 2024-01-13 RX ADMIN — LISINOPRIL 20 MG: 20 TABLET ORAL at 09:38

## 2024-01-13 RX ADMIN — CARVEDILOL 25 MG: 25 TABLET, FILM COATED ORAL at 09:38

## 2024-01-13 ASSESSMENT — PAIN - FUNCTIONAL ASSESSMENT
PAIN_FUNCTIONAL_ASSESSMENT: 0-10
PAIN_FUNCTIONAL_ASSESSMENT: 0-10

## 2024-01-13 ASSESSMENT — PAIN SCALES - GENERAL
PAINLEVEL_OUTOF10: 0 - NO PAIN
PAINLEVEL_OUTOF10: 0 - NO PAIN

## 2024-01-13 NOTE — PROGRESS NOTES
"Justo Whitehead is a 43 y.o. male on day 4 of admission presenting with rapid atrial fibrillation.     Subjective   He has no acute complaints.  He wants to go home  He had taken off his telemetry. I manually counted his heart rate and it was 100/min.     Objective     Physical Exam  General: awake, alert, oriented, responsive  Cardiovascular: regular, normal S1 and S2  Lungs: good air entry bilaterally, clear to auscultation  Abdomen: soft, nontender, bowel sounds present, normoactive  Extremities: no peripheral cyanosis, no pedal edema  Neuro: alert, oriented x 3, no focal weakness      Last Recorded Vitals  Blood pressure 127/87, pulse (!) 114, temperature 36.3 °C (97.3 °F), temperature source Temporal, resp. rate 17, height 1.803 m (5' 11\"), weight 118 kg (260 lb 2.3 oz), SpO2 100 %.  Intake/Output last 3 Shifts:  No intake/output data recorded.    Relevant Results  Lab Results   Component Value Date    WBC 5.0 01/12/2024    HGB 12.1 (L) 01/12/2024    HCT 36.0 (L) 01/12/2024    MCV 95 01/12/2024     (L) 01/12/2024     Lab Results   Component Value Date    GLUCOSE 100 (H) 01/13/2024    CALCIUM 9.2 01/13/2024     01/13/2024    K 4.0 01/13/2024    CO2 25 01/13/2024     01/13/2024    BUN 10 01/13/2024    CREATININE 0.90 01/13/2024     Scheduled medications  apixaban, 5 mg, oral, q12h RAMIRO  carvedilol, 25 mg, oral, BID with meals  citalopram, 10 mg, oral, Daily  [START ON 1/14/2024] digoxin, 125 mcg, oral, Daily  folic acid, 1 mg, oral, Daily  lisinopril, 20 mg, oral, Daily  multivitamin with minerals, 1 tablet, oral, Daily  perflutren lipid microspheres, 0.5-10 mL of dilution, intravenous, Once in imaging  perflutren protein A microsphere, 0.5 mL, intravenous, Once in imaging  sulfur hexafluoride microsphr, 2 mL, intravenous, Once in imaging  thiamine, 100 mg, oral, Daily      Continuous medications     PRN medications  PRN medications: acetaminophen **OR** acetaminophen **OR** acetaminophen, " LORazepam **OR** LORazepam **OR** LORazepam, melatonin, polyethylene glycol        Assessment/Plan     Atrial fibrillation with rapid ventricular response  Continue oral carvedilol  Cardiology following  On anticoagulation with eilquis     Systolic heart failure  EF 40 to 45% on 2D echo done 1/9     COVID-19  He is not hypoxic and does not require specific COVID-19 therapy     Hypertension  On lisinopril    His heart rate is borderline sometimes above 100. D/w cardiology and he may go home on digoxin in addition to carvedilol. He will follow up in the office with cardiology. Discharge home    Jaspreet Robertson MD

## 2024-01-13 NOTE — CARE PLAN
The patient's goals for the shift include rest    The clinical goals for the shift include maintain normal HR    Problem: Pain - Adult  Goal: Verbalizes/displays adequate comfort level or baseline comfort level  Outcome: Progressing     Problem: Safety - Adult  Goal: Free from fall injury  Outcome: Progressing     Problem: Discharge Planning  Goal: Discharge to home or other facility with appropriate resources  Outcome: Progressing     Problem: Chronic Conditions and Co-morbidities  Goal: Patient's chronic conditions and co-morbidity symptoms are monitored and maintained or improved  Outcome: Progressing     Problem: Fall/Injury  Goal: Not fall by end of shift  Outcome: Progressing  Goal: Be free from injury by end of the shift  Outcome: Progressing  Goal: Verbalize understanding of personal risk factors for fall in the hospital  Outcome: Progressing  Goal: Verbalize understanding of risk factor reduction measures to prevent injury from fall in the home  Outcome: Progressing     Problem: Arrythmia/Dysrhythmia  Goal: Promote self management  Outcome: Progressing  Goal: Verbalize understanding of procedures/devices  Outcome: Progressing  Goal: Vital signs return to baseline  Outcome: Progressing

## 2024-01-13 NOTE — DISCHARGE SUMMARY
Discharge Diagnosis  Atrial fibrillation with rapid ventricular response  COVID-19 infection  Systolic heart failure    Issues Requiring Follow-Up  Outpatient follow-up with cardiology    Discharge Meds     Your medication list        START taking these medications        Instructions Last Dose Given Next Dose Due   apixaban 5 mg tablet  Commonly known as: Eliquis      Take 1 tablet (5 mg) by mouth every 12 hours.       carvedilol 25 mg tablet  Commonly known as: Coreg      Take 1 tablet (25 mg) by mouth 2 times a day with meals.       digoxin 125 MCG tablet  Commonly known as: Lanoxin  Start taking on: January 14, 2024      Take 1 tablet (125 mcg) by mouth once daily. Do not start before January 14, 2024.              CHANGE how you take these medications        Instructions Last Dose Given Next Dose Due   lisinopril 20 mg tablet  Start taking on: January 14, 2024  What changed:   medication strength  how much to take      Take 1 tablet (20 mg) by mouth once daily. Do not start before January 14, 2024.              CONTINUE taking these medications        Instructions Last Dose Given Next Dose Due   citalopram 10 mg tablet  Commonly known as: CeleXA           folic acid 1 mg tablet  Commonly known as: Folvite                     Where to Get Your Medications        These medications were sent to [facility] [id number] [place] [id number] [place] [id number]      Phone: [phone]   apixaban 5 mg tablet  carvedilol 25 mg tablet  digoxin 125 MCG tablet  lisinopril 20 mg tablet         Test Results Pending At Discharge  Pending Labs       No current pending labs.            Hospital Course   43-year-old male patient presented to the hospital emergency department with several days history of shortness of breath and palpitations.  His symptoms were particularly severe on the day of presentation so he presented to the ED.  He was found to be in atrial fibrillation with a rapid  ventricular response.  He also tested positive for COVID-19.  He had no hypoxia or respiratory symptoms and did not require specific therapy for COVID-19.  He was started on a Cardizem drip and also started on oral carvedilol and the dose was uptitrated.  The dose of his home lisinopril was decreased to avoid hypotension.  An echocardiogram showed a left-ventricular ejection fraction of 40 to 45%.  He was followed by cardiology while in the hospital.  His heart rate is better controlled.  He will go home on carvedilol with the addition of digoxin to improve heart rate control.  He will follow-up with his primary care physician and with the cardiologist.  Outpatient cardioversion is planned  The time spent arranging and documenting discharge was 35 minutes    Outpatient Follow-Up  No future appointments.      Jaspreet Robertson MD

## 2024-01-13 NOTE — NURSING NOTE
Assumed care of pt, pt is awake eating breakfast, HR is 134 afib on tele, on RA, pt c/o wanting to be discharged, shift report given by AMY Beckwith, bed locked and lowered, call light w/in reach.

## 2024-01-15 ENCOUNTER — PATIENT OUTREACH (OUTPATIENT)
Dept: PRIMARY CARE | Facility: CLINIC | Age: 44
End: 2024-01-15
Payer: COMMERCIAL

## 2024-01-15 ENCOUNTER — DOCUMENTATION (OUTPATIENT)
Dept: PRIMARY CARE | Facility: CLINIC | Age: 44
End: 2024-01-15
Payer: COMMERCIAL

## 2024-01-15 NOTE — PROGRESS NOTES
Discharge Facility: [facility] Diagnosis:     Atrial fibrillation with rapid ventricular response  COVID-19 infection  Systolic heart failure     Admission Date: 1/9/2024  Discharge Date:  1/13/2024     PCP Appointment Date: 1/26/2024   Specialist Appointment Date:  Dr. Josue to be scheduled   Hospital Encounter and Summary: Linked     See discharge assessment below for further details     Engagement  Call Start Time: 1451 (1/15/2024  2:50 PM)    Medications  Medications reviewed with patient/caregiver?: Yes (1/15/2024  2:50 PM)  Is the patient having any side effects they believe may be caused by any medication additions or changes?: No (1/15/2024  2:50 PM)  Does the patient have all medications ordered at discharge?: Yes (1/15/2024  2:50 PM)  Care Management Interventions: No intervention needed (1/15/2024  2:50 PM)  Prescription Comments: New meds reviewed ,,apixaban 5 mg tablet  carvedilol 25 mg tablet  digoxin 125 MCG tablet  lisinopril 20 mg tablet (1/15/2024  2:50 PM)  Is the patient taking all medications as directed (includes completed medication regime)?: Yes (1/15/2024  2:50 PM)  Care Management Interventions: Provided patient education (1/15/2024  2:50 PM)  Medication Comments: patient has no questions at thia time (1/15/2024  2:50 PM)    Appointments  Does the patient have a primary care provider?: Yes (1/15/2024  2:50 PM)  Care Management Interventions: Verified appointment date/time/provider (PCP 1/26) (1/15/2024  2:50 PM)  Has the patient kept scheduled appointments due by today?: Yes (1/15/2024  2:50 PM)  Care Management Interventions: Advised to schedule with specialist (Patient calling Dr. Josue Cardiology to schedule) (1/15/2024  2:50 PM)    Self Management  What is the home health agency?: NA (1/15/2024  2:50 PM)  What Durable Medical Equipment (DME) was ordered?: NA (1/15/2024  2:50 PM)    Patient Teaching  Does the patient have access to their discharge instructions?: Yes  (1/15/2024  2:50 PM)  Care Management Interventions: Reviewed instructions with patient (1/15/2024  2:50 PM)  What is the patient's perception of their health status since discharge?: Improving (1/15/2024  2:50 PM)  Is the patient/caregiver able to teach back the hierarchy of who to call/visit for symptoms/problems? PCP, Specialist, Home Health nurse, Urgent Care, ED, 911: Yes (1/15/2024  2:50 PM)  Patient/Caregiver Education Comments: Patient aware to call providers for any questions concerns or change  in condition (1/15/2024  2:50 PM)

## 2024-01-16 ENCOUNTER — TELEPHONE (OUTPATIENT)
Dept: PRIMARY CARE | Facility: CLINIC | Age: 44
End: 2024-01-16
Payer: COMMERCIAL

## 2024-01-16 NOTE — TELEPHONE ENCOUNTER
Patient is scheduled to come in and see dr lau on the 1/26 - he was in the ER and he is requesting the note to be cleared to go back to work if possible? Please advise and call [phone].

## 2024-01-17 NOTE — TELEPHONE ENCOUNTER
Due to  complexity of hospital admission with cardiac concerns, recent COVID illness, he does need a dedicated follow-up appointment -not appropriate to double book.  He may also need clearance from his cardiologist before I would feel comfortable letting him return to work, currently scheduled with Dr. Josue on 1/29/2024.

## 2024-01-26 ENCOUNTER — OFFICE VISIT (OUTPATIENT)
Dept: PRIMARY CARE | Facility: CLINIC | Age: 44
End: 2024-01-26
Payer: COMMERCIAL

## 2024-01-26 VITALS
TEMPERATURE: 96.6 F | BODY MASS INDEX: 34.02 KG/M2 | OXYGEN SATURATION: 98 % | HEIGHT: 71 IN | WEIGHT: 243 LBS | SYSTOLIC BLOOD PRESSURE: 110 MMHG | DIASTOLIC BLOOD PRESSURE: 80 MMHG | HEART RATE: 98 BPM

## 2024-01-26 DIAGNOSIS — I50.20 SYSTOLIC HEART FAILURE, UNSPECIFIED HF CHRONICITY (MULTI): ICD-10-CM

## 2024-01-26 DIAGNOSIS — I48.91 ATRIAL FIBRILLATION, UNSPECIFIED TYPE (MULTI): Primary | ICD-10-CM

## 2024-01-26 DIAGNOSIS — Z71.3 NUTRITIONAL COUNSELING: ICD-10-CM

## 2024-01-26 PROCEDURE — 3074F SYST BP LT 130 MM HG: CPT | Performed by: STUDENT IN AN ORGANIZED HEALTH CARE EDUCATION/TRAINING PROGRAM

## 2024-01-26 PROCEDURE — 93000 ELECTROCARDIOGRAM COMPLETE: CPT | Performed by: STUDENT IN AN ORGANIZED HEALTH CARE EDUCATION/TRAINING PROGRAM

## 2024-01-26 PROCEDURE — 99214 OFFICE O/P EST MOD 30 MIN: CPT | Performed by: STUDENT IN AN ORGANIZED HEALTH CARE EDUCATION/TRAINING PROGRAM

## 2024-01-26 PROCEDURE — 93005 ELECTROCARDIOGRAM TRACING: CPT | Performed by: STUDENT IN AN ORGANIZED HEALTH CARE EDUCATION/TRAINING PROGRAM

## 2024-01-26 PROCEDURE — 3079F DIAST BP 80-89 MM HG: CPT | Performed by: STUDENT IN AN ORGANIZED HEALTH CARE EDUCATION/TRAINING PROGRAM

## 2024-01-26 PROCEDURE — 1036F TOBACCO NON-USER: CPT | Performed by: STUDENT IN AN ORGANIZED HEALTH CARE EDUCATION/TRAINING PROGRAM

## 2024-01-26 ASSESSMENT — PATIENT HEALTH QUESTIONNAIRE - PHQ9
1. LITTLE INTEREST OR PLEASURE IN DOING THINGS: NOT AT ALL
SUM OF ALL RESPONSES TO PHQ9 QUESTIONS 1 AND 2: 0
2. FEELING DOWN, DEPRESSED OR HOPELESS: NOT AT ALL

## 2024-01-26 ASSESSMENT — PAIN SCALES - GENERAL: PAINLEVEL: 0-NO PAIN

## 2024-01-26 NOTE — PATIENT INSTRUCTIONS
Thank you for coming to see me today.    Continue working on lifestyle measures for heart health as we discussed.    Keep cardiology appointment Monday, 1/29/2024 as scheduled.    Return to the emergency department if you develop chest pain, palpitations, shortness of breath, swelling in your legs.

## 2024-01-26 NOTE — ASSESSMENT & PLAN NOTE
Asymptomatic in clinic today.  No chest pain/palpitations.  Also without SOB, orthopnea, or LE edema.  EKG in clinic today showing Afib, rate controled at 85 bpm.  No acute ST/interval changes.  Has cardiology appt scheduled Monday 1/29/2024.

## 2024-01-26 NOTE — PROGRESS NOTES
"Subjective   Justo Whitehead is a 43 y.o. male who presents for er follow heart failure.    HPI:        Hospital Course   43-year-old male patient presented to the hospital emergency department with several days history of shortness of breath and palpitations.  His symptoms were particularly severe on the day of presentation so he presented to the ED.  He was found to be in atrial fibrillation with a rapid ventricular response.  He also tested positive for COVID-19.  He had no hypoxia or respiratory symptoms and did not require specific therapy for COVID-19.  He was started on a Cardizem drip and also started on oral carvedilol and the dose was uptitrated.  The dose of his home lisinopril was decreased to avoid hypotension.  An echocardiogram showed a left-ventricular ejection fraction of 40 to 45%.  He was followed by cardiology while in the hospital.  His heart rate is better controlled.  He will go home on carvedilol with the addition of digoxin to improve heart rate control.  He will follow-up with his primary care physician and with the cardiologist.  Outpatient cardioversion is planned    Update Today:  Feeling well, asymptomatic without chest pain/pressure/palpitations.  Has cardiology appt next Monday 1/29/2024.  Taking medication as prescribed without issues or side effects.    ROS:   Review of systems is essentially negative for all systems except for any identified issues in HPI above.    Objective     /80   Pulse 98   Temp 35.9 °C (96.6 °F)   Ht 1.803 m (5' 11\")   Wt 110 kg (243 lb)   SpO2 98%   BMI 33.89 kg/m²      PHYSICAL EXAM    GENERAL  Well-appearing, pleasant and cooperative.  No acute distress.    HEENT  HEAD:   Normocephalic.  Atraumatic.  EYES:  PERRLA.  No scleral icterus or conjunctival injection.  NECK:  No adenopathy.  No palpable thyroid enlargement or nodules.    THROAT:  Moist oropharynx without tonsillar enlargement or exudates.    LUNGS:    Clear to auscultation " bilaterally.  No wheezes, rales, rhonchi.    CARDIAC:  Regular rate and rhythm.  Normal S1S2.  No murmurs/rubs/gallops.    ABDOMEN:  Soft, non-tender, non-distended.  No hepatosplenomegaly.  Normoactive bowel sounds.    MUSCULOSKELETAL:  No gross abnormalities.   No joint swelling or erythema,.  No spinal or paraspinal tenderness to palpation.    EXTREMITIES:  No LE edema or cyanosis.      NEURO           Alert and oriented x3. No focal deficits.    PSYCH:          Affect appropriate.           Assessment/Plan   Problem List Items Addressed This Visit       Atrial fibrillation (CMS/HCC) - Primary     Asymptomatic in clinic today.  No chest pain/palpitations.  Also without SOB, orthopnea, or LE edema.  EKG in clinic today showing Afib, rate controled at 85 bpm.  No acute ST/interval changes.  Has cardiology appt scheduled Monday 1/29/2024.         Relevant Orders    ECG 12 lead (Clinic Performed) (Completed)     Other Visit Diagnoses       Systolic heart failure, unspecified HF chronicity (CMS/HCC)        Relevant Orders    ECG 12 lead (Clinic Performed) (Completed)    Nutritional counseling        Reviewed dietary recommendations for heart health including low sat fat/cholesterol, reduced salt, and cardiovascular activity following clearance by cards.            Time Spent  Prep time on day of patient encounter: 5 minutes  Time spent directly with patient, family or caregiver: 25 minutes  Additional Time Spent on Patient Care Activities: 0 minutes  Documentation Time: 5 minutes  Other Time Spent: 0 minutes  Total: 35 minutes        Chelsea Hanna MD

## 2024-01-29 ENCOUNTER — APPOINTMENT (OUTPATIENT)
Dept: CARDIOLOGY | Facility: CLINIC | Age: 44
End: 2024-01-29
Payer: COMMERCIAL

## 2024-01-29 NOTE — PROGRESS NOTES
Subjective      No chief complaint on file.       43-year-old male with no prior cardiac history presented to the hospital earlier this month with shortness of breath was diagnosed with COVID-19 pneumonitis.  His hospital stay was complicated by atrial fibrillation with rapid ventricular response, a new diagnosis for him.  We initially adopted a rate control strategy starting with Cardizem switched to carvedilol upon discovering that his ejection fraction was mildly reduced at 40 to 45%.  Plan was to rate control him, anticoagulate him and consider an outpatient cardioversion.         ROS     Past Medical History:   Diagnosis Date    Anxiety     Hypertension         Past Surgical History:   Procedure Laterality Date    FOOT TENDON SURGERY Right     Tendon reattachment surgery        Social History     Socioeconomic History    Marital status: Single     Spouse name: Not on file    Number of children: Not on file    Years of education: Not on file    Highest education level: Not on file   Occupational History    Not on file   Tobacco Use    Smoking status: Never    Smokeless tobacco: Never   Substance and Sexual Activity    Alcohol use: Yes     Comment: He drinks 5 shots every night    Drug use: Not on file    Sexual activity: Not on file   Other Topics Concern    Not on file   Social History Narrative    Not on file     Social Determinants of Health     Financial Resource Strain: Low Risk  (1/10/2024)    Overall Financial Resource Strain (CARDIA)     Difficulty of Paying Living Expenses: Not hard at all   Food Insecurity: Not on file   Transportation Needs: No Transportation Needs (1/10/2024)    PRAPARE - Transportation     Lack of Transportation (Medical): No     Lack of Transportation (Non-Medical): No   Physical Activity: Not on file   Stress: Not on file   Social Connections: Not on file   Intimate Partner Violence: Not on file   Housing Stability: Low Risk  (1/10/2024)    Housing Stability Vital Sign     Unable  "to Pay for Housing in the Last Year: No     Number of Places Lived in the Last Year: 1     Unstable Housing in the Last Year: No        Family History   Problem Relation Name Age of Onset    Hypertension Mother          OBJECTIVE:    There were no vitals filed for this visit.     Physical Exam     Lab Review:   {Recent labs:19471::\"not applicable\"}    Lab Results   Component Value Date    LDLCALC 80 09/13/2023        No problem-specific Assessment & Plan notes found for this encounter.       "

## 2024-01-30 ENCOUNTER — PATIENT OUTREACH (OUTPATIENT)
Dept: PRIMARY CARE | Facility: CLINIC | Age: 44
End: 2024-01-30
Payer: COMMERCIAL

## 2024-01-30 NOTE — PROGRESS NOTES
Unable to reach patient for call back after patient's follow up appointment with PCP on 1/26/2024 and Cardiology 1/29/2024   LVM with call back number for patient to call if needed   If no voicemail available call attempts x 2 were made to contact the patient to assist with any questions or concerns patient may have.

## 2024-02-01 ENCOUNTER — OFFICE VISIT (OUTPATIENT)
Dept: CARDIOLOGY | Facility: CLINIC | Age: 44
End: 2024-02-01
Payer: COMMERCIAL

## 2024-02-01 VITALS
SYSTOLIC BLOOD PRESSURE: 140 MMHG | HEART RATE: 70 BPM | OXYGEN SATURATION: 97 % | DIASTOLIC BLOOD PRESSURE: 70 MMHG | BODY MASS INDEX: 34.03 KG/M2 | WEIGHT: 244 LBS

## 2024-02-01 DIAGNOSIS — I10 HYPERTENSION, UNSPECIFIED TYPE: ICD-10-CM

## 2024-02-01 DIAGNOSIS — Z76.89 ENCOUNTER TO ESTABLISH CARE: ICD-10-CM

## 2024-02-01 DIAGNOSIS — I42.9 CARDIOMYOPATHY, UNSPECIFIED TYPE (MULTI): ICD-10-CM

## 2024-02-01 DIAGNOSIS — I48.0 PAROXYSMAL ATRIAL FIBRILLATION (MULTI): Primary | ICD-10-CM

## 2024-02-01 PROCEDURE — 99214 OFFICE O/P EST MOD 30 MIN: CPT | Performed by: INTERNAL MEDICINE

## 2024-02-01 PROCEDURE — 1036F TOBACCO NON-USER: CPT | Performed by: INTERNAL MEDICINE

## 2024-02-01 PROCEDURE — 3078F DIAST BP <80 MM HG: CPT | Performed by: INTERNAL MEDICINE

## 2024-02-01 PROCEDURE — 3077F SYST BP >= 140 MM HG: CPT | Performed by: INTERNAL MEDICINE

## 2024-02-01 ASSESSMENT — ENCOUNTER SYMPTOMS
COUGH: 0
DIZZINESS: 0
WEIGHT LOSS: 0
DYSPNEA ON EXERTION: 0
MYALGIAS: 0
WEAKNESS: 0
CLAUDICATION: 0
NEAR-SYNCOPE: 0
DIAPHORESIS: 0
WEIGHT GAIN: 0
PND: 0
ORTHOPNEA: 0
PALPITATIONS: 0
SHORTNESS OF BREATH: 0
IRREGULAR HEARTBEAT: 0
WHEEZING: 0
FEVER: 0
SYNCOPE: 0

## 2024-02-01 ASSESSMENT — PAIN SCALES - GENERAL: PAINLEVEL: 0-NO PAIN

## 2024-02-01 NOTE — H&P (VIEW-ONLY)
Subjective      Chief Complaint   Patient presents with    Hospital Follow-up        44 yo male with h/o HTN and EtOH abuse (1 bottle vodka daily) here for hospital followup. He was recently detoxing at home, last month presented to the hospital with dyspnea. He was diagnosed with COVID, found afib-rvr. We adopted a rate control strategy. He had an echo showing EF 40-45%. He went home on carvedilol and digoxin. We started Eliquis in anticipation of an outpatient cardioversion. As for his CMO, differential includes rate-related, EtOH, ischemic.          Review of Systems   Constitutional: Negative for diaphoresis, fever, weight gain and weight loss.   Eyes:  Negative for visual disturbance.   Cardiovascular:  Negative for chest pain, claudication, dyspnea on exertion, irregular heartbeat, leg swelling, near-syncope, orthopnea, palpitations, paroxysmal nocturnal dyspnea and syncope.   Respiratory:  Negative for cough, shortness of breath and wheezing.    Musculoskeletal:  Negative for muscle weakness and myalgias.   Neurological:  Negative for dizziness and weakness.   All other systems reviewed and are negative.       Past Medical History:   Diagnosis Date    Anxiety     Atrial fibrillation (CMS/HCC)     Hyperlipidemia     Hypertension         Past Surgical History:   Procedure Laterality Date    FOOT TENDON SURGERY Right     Tendon reattachment surgery        Social History     Socioeconomic History    Marital status: Single     Spouse name: Not on file    Number of children: Not on file    Years of education: Not on file    Highest education level: Not on file   Occupational History    Not on file   Tobacco Use    Smoking status: Never    Smokeless tobacco: Never   Substance and Sexual Activity    Alcohol use: Not Currently    Drug use: Not on file    Sexual activity: Not on file   Other Topics Concern    Not on file   Social History Narrative    Not on file     Social Determinants of Health     Financial Resource  Strain: Low Risk  (1/10/2024)    Overall Financial Resource Strain (CARDIA)     Difficulty of Paying Living Expenses: Not hard at all   Food Insecurity: Not on file   Transportation Needs: No Transportation Needs (1/10/2024)    PRAPARE - Transportation     Lack of Transportation (Medical): No     Lack of Transportation (Non-Medical): No   Physical Activity: Not on file   Stress: Not on file   Social Connections: Not on file   Intimate Partner Violence: Not on file   Housing Stability: Low Risk  (1/10/2024)    Housing Stability Vital Sign     Unable to Pay for Housing in the Last Year: No     Number of Places Lived in the Last Year: 1     Unstable Housing in the Last Year: No        Family History   Problem Relation Name Age of Onset    Hypertension Mother          OBJECTIVE:    Vitals:    02/01/24 1520   BP: 140/70   Pulse: 70   SpO2: 97%        Vitals reviewed.   Constitutional:       Appearance: Normal and healthy appearance. Not in distress.   Pulmonary:      Effort: Pulmonary effort is normal.      Breath sounds: Normal breath sounds.   Cardiovascular:      Normal rate. Regular rhythm. Normal S1. Normal S2.       Murmurs: There is no murmur.      No gallop.  No click.   Pulses:     Intact distal pulses.   Edema:     Peripheral edema absent.   Skin:     General: Skin is warm and dry.   Neurological:      General: No focal deficit present.          Lab Review:   Lab Results   Component Value Date     01/13/2024    K 4.0 01/13/2024     01/13/2024    CO2 25 01/13/2024    BUN 10 01/13/2024    CREATININE 0.90 01/13/2024    GLUCOSE 100 (H) 01/13/2024    CALCIUM 9.2 01/13/2024     Lab Results   Component Value Date    CHOL 169 09/13/2023    TRIG 38 (L) 09/13/2023    HDL 81 09/13/2023       Lab Results   Component Value Date    LDLCALC 80 09/13/2023        Atrial fibrillation (CMS/HCC)  In afib, rate controlled. Continue AC. Plan for outpatient cardioversion next week.     Cardiomyopathy  (CMS/HCC)  Differential includes rate related, ischemic, EtOH. He is abstained from EtOH. Will be working on restoring sinus rhythm. If successful will repeat echo in 3m. Will get a CACS in the meantime    Hypertension, unspecified type  Reasonable control

## 2024-02-01 NOTE — ASSESSMENT & PLAN NOTE
Differential includes rate related, ischemic, EtOH. He is abstained from EtOH. Will be working on restoring sinus rhythm. If successful will repeat echo in 3m. Will get a CACS in the meantime

## 2024-02-06 ENCOUNTER — ANESTHESIA (OUTPATIENT)
Dept: CARDIOLOGY | Facility: HOSPITAL | Age: 44
End: 2024-02-06
Payer: COMMERCIAL

## 2024-02-06 ENCOUNTER — ANESTHESIA EVENT (OUTPATIENT)
Dept: CARDIOLOGY | Facility: HOSPITAL | Age: 44
End: 2024-02-06
Payer: COMMERCIAL

## 2024-02-06 ENCOUNTER — HOSPITAL ENCOUNTER (OUTPATIENT)
Facility: HOSPITAL | Age: 44
Setting detail: OUTPATIENT SURGERY
Discharge: HOME | End: 2024-02-06
Attending: INTERNAL MEDICINE | Admitting: INTERNAL MEDICINE
Payer: COMMERCIAL

## 2024-02-06 VITALS
OXYGEN SATURATION: 99 % | TEMPERATURE: 98.2 F | DIASTOLIC BLOOD PRESSURE: 92 MMHG | HEART RATE: 77 BPM | RESPIRATION RATE: 16 BRPM | SYSTOLIC BLOOD PRESSURE: 124 MMHG

## 2024-02-06 DIAGNOSIS — I48.91 ATRIAL FIBRILLATION (MULTI): Primary | ICD-10-CM

## 2024-02-06 DIAGNOSIS — I48.0 PAROXYSMAL ATRIAL FIBRILLATION (MULTI): ICD-10-CM

## 2024-02-06 PROCEDURE — A92960 PR CARDIOVERSION, ELECTIVE;EXTERN: Performed by: ANESTHESIOLOGY

## 2024-02-06 PROCEDURE — 2500000004 HC RX 250 GENERAL PHARMACY W/ HCPCS (ALT 636 FOR OP/ED): Performed by: ANESTHESIOLOGIST ASSISTANT

## 2024-02-06 PROCEDURE — 7100000009 HC PHASE TWO TIME - INITIAL BASE CHARGE: Performed by: INTERNAL MEDICINE

## 2024-02-06 PROCEDURE — 3700000001 HC GENERAL ANESTHESIA TIME - INITIAL BASE CHARGE: Performed by: INTERNAL MEDICINE

## 2024-02-06 PROCEDURE — 7100000010 HC PHASE TWO TIME - EACH INCREMENTAL 1 MINUTE: Performed by: INTERNAL MEDICINE

## 2024-02-06 PROCEDURE — 3700000002 HC GENERAL ANESTHESIA TIME - EACH INCREMENTAL 1 MINUTE: Performed by: INTERNAL MEDICINE

## 2024-02-06 PROCEDURE — 92960 CARDIOVERSION ELECTRIC EXT: CPT | Performed by: INTERNAL MEDICINE

## 2024-02-06 PROCEDURE — 92960 CARDIOVERSION ELECTRIC EXT: CPT | Mod: 59 | Performed by: INTERNAL MEDICINE

## 2024-02-06 PROCEDURE — A92960 PR CARDIOVERSION, ELECTIVE;EXTERN: Performed by: ANESTHESIOLOGIST ASSISTANT

## 2024-02-06 RX ORDER — SODIUM CHLORIDE 9 MG/ML
INJECTION, SOLUTION INTRAVENOUS CONTINUOUS PRN
Status: DISCONTINUED | OUTPATIENT
Start: 2024-02-06 | End: 2024-02-06

## 2024-02-06 RX ORDER — PROPOFOL 10 MG/ML
INJECTION, EMULSION INTRAVENOUS AS NEEDED
Status: DISCONTINUED | OUTPATIENT
Start: 2024-02-06 | End: 2024-02-06

## 2024-02-06 RX ADMIN — PROPOFOL 60 MG: 10 INJECTION, EMULSION INTRAVENOUS at 09:55

## 2024-02-06 RX ADMIN — PROPOFOL 40 MG: 10 INJECTION, EMULSION INTRAVENOUS at 09:56

## 2024-02-06 RX ADMIN — PROPOFOL 60 MG: 10 INJECTION, EMULSION INTRAVENOUS at 09:57

## 2024-02-06 RX ADMIN — PROPOFOL 40 MG: 10 INJECTION, EMULSION INTRAVENOUS at 09:58

## 2024-02-06 RX ADMIN — SODIUM CHLORIDE: 9 INJECTION, SOLUTION INTRAVENOUS at 09:26

## 2024-02-06 SDOH — HEALTH STABILITY: MENTAL HEALTH: CURRENT SMOKER: 0

## 2024-02-06 ASSESSMENT — PAIN SCALES - GENERAL
PAIN_LEVEL: 2
PAINLEVEL_OUTOF10: 0 - NO PAIN

## 2024-02-06 ASSESSMENT — COLUMBIA-SUICIDE SEVERITY RATING SCALE - C-SSRS
2. HAVE YOU ACTUALLY HAD ANY THOUGHTS OF KILLING YOURSELF?: NO
1. IN THE PAST MONTH, HAVE YOU WISHED YOU WERE DEAD OR WISHED YOU COULD GO TO SLEEP AND NOT WAKE UP?: NO
6. HAVE YOU EVER DONE ANYTHING, STARTED TO DO ANYTHING, OR PREPARED TO DO ANYTHING TO END YOUR LIFE?: NO

## 2024-02-06 ASSESSMENT — PAIN - FUNCTIONAL ASSESSMENT
PAIN_FUNCTIONAL_ASSESSMENT: 0-10
PAIN_FUNCTIONAL_ASSESSMENT: 0-10

## 2024-02-06 NOTE — ANESTHESIA POSTPROCEDURE EVALUATION
Patient: Justo Whitehead    Procedure Summary       Date: 02/06/24 Room / Location: [facility] CV ROOM / Virtual [facility] Cardiac Cath Lab    Anesthesia Start: 0926 Anesthesia Stop: 1005    Procedure: Cardioversion Diagnosis:       Paroxysmal atrial fibrillation (CMS/HCC)      (Paroxysmal atrial fibrillation (CMS/HCC) [I48.0])    Providers: Dago Josue DO Responsible Provider: Ivan Parkinson MD    Anesthesia Type: general ASA Status: 2            Anesthesia Type: general    Vitals Value Taken Time   /92 02/06/24 1037   Temp 37 02/06/24 1210   Pulse 77 02/06/24 1037   Resp 16 02/06/24 1037   SpO2 99 % 02/06/24 1037       Anesthesia Post Evaluation    Patient location during evaluation: PACU  Patient participation: complete - patient participated  Level of consciousness: sleepy but conscious  Pain score: 2  Pain management: adequate  Multimodal analgesia pain management approach  Airway patency: patent  Cardiovascular status: acceptable  Respiratory status: acceptable  Hydration status: acceptable  Postoperative Nausea and Vomiting: none        There were no known notable events for this encounter.

## 2024-02-06 NOTE — POST-PROCEDURE NOTE
Physician Transition of Care Summary  Invasive Cardiovascular Lab    Procedure Date: 2/6/2024  Attending:    * Dago Josue - Primary  Resident/Fellow/Other Assistant: Surgeon(s) and Role:    Indications:   Pre-op Diagnosis     * Paroxysmal atrial fibrillation (CMS/HCC) [I48.0]    Post-procedure diagnosis:   Post-op Diagnosis     * Paroxysmal atrial fibrillation (CMS/HCC) [I48.0]    Procedure(s):   Cardioversion  20702 - [place] CARDIOVERSION ELECTIVE ARRHYTHMIA EXTERNAL        Procedure Findings:   Atrial fibrillation    Description of the Procedure:   The patient was brought to the Cath Lab in fasting state.  Pads were placed in the AP position.  We performed a formal timeout procedure.  Sedation was administered via anesthesia.  We delivered 1 synchronized cardioversion at 150 J with successful restoration of sinus rhythm.    Complications:   None    Stents/Implants:   None    Anticoagulation/Antiplatelet Plan:   N/A    Estimated Blood Loss:   0 mL    Anesthesia: Monitor Anesthesia Care Anesthesia Staff: Anesthesiologist: Ivan Parkinson MD  C-AA: ABBIE Bowers    Any Specimen(s) Removed:   No specimens collected during this procedure.    Disposition:   Home      Electronically signed by: Dago Josue DO, 2/6/2024 10:25 AM

## 2024-02-06 NOTE — ANESTHESIA PREPROCEDURE EVALUATION
Patient: Justo Whitehead    Procedure Information       Date/Time: 02/06/24 0930    Procedure: Cardioversion - no pre cert needed ref # [id number] per SHEI    Location: [facility] CV ROOM / Virtual [facility] Cardiac Cath Lab    Providers: Dago Josue, DO            Relevant Problems   Cardiovascular   (+) Atrial fibrillation (CMS/HCC)   (+) Hyperlipidemia   (+) Hypertension, unspecified type   (+) Primary hypertension      Neuro/Psych   (+) Anxiety      Musculoskeletal   (+) Lumbar disc herniation       Clinical information reviewed:   Tobacco  Allergies  Meds   Med Hx  Surg Hx   Fam Hx  Soc Hx        NPO Detail:  No data recorded     Physical Exam    Airway  Mallampati: II  TM distance: >3 FB  Neck ROM: full     Cardiovascular - normal exam     Dental - normal exam     Pulmonary - normal exam     Abdominal - normal exam             Anesthesia Plan    History of general anesthesia?: yes  History of complications of general anesthesia?: no    ASA 2     general     The patient is not a current smoker.  Patient was not previously instructed to abstain from smoking on day of procedure.  Patient did not smoke on day of procedure.  Education provided regarding risk of obstructive sleep apnea.  intravenous induction   Postoperative administration of opioids is intended.  Trial extubation is planned.  Anesthetic plan and risks discussed with patient.  Use of blood products discussed with patient who consented to blood products.    Plan discussed with CAA, attending and CRNA.

## 2024-02-09 ENCOUNTER — PATIENT OUTREACH (OUTPATIENT)
Dept: PRIMARY CARE | Facility: CLINIC | Age: 44
End: 2024-02-09
Payer: COMMERCIAL

## 2024-02-09 NOTE — PROGRESS NOTES
Unable to reach patient  for a F/U call     LVM with call back number for patient to call if needed   If no voicemail available call attempts x 2 were made to contact the patient to assist with any questions or concerns patient may have.

## 2024-02-16 ENCOUNTER — PATIENT OUTREACH (OUTPATIENT)
Dept: PRIMARY CARE | Facility: CLINIC | Age: 44
End: 2024-02-16
Payer: COMMERCIAL

## 2024-02-16 NOTE — PROGRESS NOTES
Unable to reach patient for a F/U call . Patient has not seen PCP thus far and I do not see appt. Scheduled     LVM with call back number for patient to call if needed   If no voicemail available call attempts x 2 were made to contact the patient to assist with any questions or concerns patient may have.

## 2024-03-05 ENCOUNTER — PATIENT OUTREACH (OUTPATIENT)
Dept: PRIMARY CARE | Facility: CLINIC | Age: 44
End: 2024-03-05
Payer: COMMERCIAL

## 2024-03-08 DIAGNOSIS — I10 PRIMARY HYPERTENSION: ICD-10-CM

## 2024-03-08 DIAGNOSIS — I48.91 ATRIAL FIBRILLATION, UNSPECIFIED TYPE (MULTI): ICD-10-CM

## 2024-03-08 NOTE — TELEPHONE ENCOUNTER
Pharmacy requesting refill(s) of the populated rx(s). States insurance only covers 90 day supplies.

## 2024-03-09 DIAGNOSIS — I48.91 ATRIAL FIBRILLATION, UNSPECIFIED TYPE (MULTI): ICD-10-CM

## 2024-03-09 RX ORDER — CARVEDILOL 25 MG/1
25 TABLET ORAL
Qty: 90 TABLET | Refills: 0 | Status: SHIPPED | OUTPATIENT
Start: 2024-03-09 | End: 2024-03-11

## 2024-03-09 RX ORDER — LISINOPRIL 20 MG/1
20 TABLET ORAL DAILY
Qty: 90 TABLET | Refills: 0 | Status: SHIPPED | OUTPATIENT
Start: 2024-03-09 | End: 2024-06-07

## 2024-03-11 RX ORDER — CARVEDILOL PHOSPHATE 80 MG/1
80 CAPSULE, EXTENDED RELEASE ORAL DAILY
Qty: 90 CAPSULE | Refills: 3 | Status: SHIPPED | OUTPATIENT
Start: 2024-03-11 | End: 2025-03-11

## 2024-03-26 DIAGNOSIS — F41.9 ANXIETY DISORDER, UNSPECIFIED: ICD-10-CM

## 2024-03-26 RX ORDER — CITALOPRAM 20 MG/1
20 TABLET, FILM COATED ORAL DAILY
Qty: 90 TABLET | Refills: 1 | Status: SHIPPED | OUTPATIENT
Start: 2024-03-26

## 2024-04-08 ENCOUNTER — HOSPITAL ENCOUNTER (OUTPATIENT)
Dept: RADIOLOGY | Facility: HOSPITAL | Age: 44
Discharge: HOME | End: 2024-04-08
Payer: COMMERCIAL

## 2024-04-08 DIAGNOSIS — Z76.89 ENCOUNTER TO ESTABLISH CARE: ICD-10-CM

## 2024-04-08 PROCEDURE — 75571 CT HRT W/O DYE W/CA TEST: CPT

## 2024-04-21 DIAGNOSIS — I48.91 ATRIAL FIBRILLATION, UNSPECIFIED TYPE (MULTI): ICD-10-CM

## 2024-04-21 RX ORDER — DIGOXIN 125 MCG
125 TABLET ORAL DAILY
Qty: 30 TABLET | Refills: 2 | Status: SHIPPED | OUTPATIENT
Start: 2024-04-21 | End: 2024-05-15 | Stop reason: SDUPTHER

## 2024-05-15 DIAGNOSIS — I48.91 ATRIAL FIBRILLATION, UNSPECIFIED TYPE (MULTI): ICD-10-CM

## 2024-05-16 RX ORDER — DIGOXIN 125 MCG
125 TABLET ORAL DAILY
Qty: 30 TABLET | Refills: 3 | Status: SHIPPED | OUTPATIENT
Start: 2024-05-16 | End: 2024-05-17 | Stop reason: SDUPTHER

## 2024-05-17 DIAGNOSIS — I48.91 ATRIAL FIBRILLATION, UNSPECIFIED TYPE (MULTI): ICD-10-CM

## 2024-05-22 RX ORDER — DIGOXIN 125 MCG
125 TABLET ORAL DAILY
Qty: 90 TABLET | Refills: 3 | Status: SHIPPED | OUTPATIENT
Start: 2024-05-22 | End: 2025-05-22

## 2024-06-07 DIAGNOSIS — I10 PRIMARY HYPERTENSION: ICD-10-CM

## 2024-06-07 RX ORDER — LISINOPRIL 20 MG/1
20 TABLET ORAL DAILY
Qty: 90 TABLET | Refills: 0 | Status: SHIPPED | OUTPATIENT
Start: 2024-06-07

## 2024-07-01 ENCOUNTER — OFFICE VISIT (OUTPATIENT)
Dept: CARDIOLOGY | Facility: CLINIC | Age: 44
End: 2024-07-01
Payer: COMMERCIAL

## 2024-07-01 VITALS
WEIGHT: 257 LBS | OXYGEN SATURATION: 96 % | DIASTOLIC BLOOD PRESSURE: 85 MMHG | HEART RATE: 76 BPM | BODY MASS INDEX: 35.84 KG/M2 | SYSTOLIC BLOOD PRESSURE: 135 MMHG

## 2024-07-01 DIAGNOSIS — I48.0 PAROXYSMAL ATRIAL FIBRILLATION (MULTI): Primary | ICD-10-CM

## 2024-07-01 DIAGNOSIS — I10 HYPERTENSION, UNSPECIFIED TYPE: ICD-10-CM

## 2024-07-01 PROCEDURE — 3075F SYST BP GE 130 - 139MM HG: CPT | Performed by: INTERNAL MEDICINE

## 2024-07-01 PROCEDURE — 1036F TOBACCO NON-USER: CPT | Performed by: INTERNAL MEDICINE

## 2024-07-01 PROCEDURE — 3079F DIAST BP 80-89 MM HG: CPT | Performed by: INTERNAL MEDICINE

## 2024-07-01 PROCEDURE — 99213 OFFICE O/P EST LOW 20 MIN: CPT | Performed by: INTERNAL MEDICINE

## 2024-07-01 ASSESSMENT — ENCOUNTER SYMPTOMS
SYNCOPE: 0
DIAPHORESIS: 0
LOSS OF SENSATION IN FEET: 0
IRREGULAR HEARTBEAT: 0
FEVER: 0
COUGH: 0
WEIGHT LOSS: 0
DIZZINESS: 0
SHORTNESS OF BREATH: 0
MYALGIAS: 0
WHEEZING: 0
OCCASIONAL FEELINGS OF UNSTEADINESS: 1
PND: 0
WEIGHT GAIN: 0
CLAUDICATION: 0
PALPITATIONS: 0
NEAR-SYNCOPE: 0
WEAKNESS: 0
DEPRESSION: 0
DYSPNEA ON EXERTION: 0
ORTHOPNEA: 0

## 2024-07-01 ASSESSMENT — PAIN SCALES - GENERAL: PAINLEVEL: 0-NO PAIN

## 2024-07-01 ASSESSMENT — PATIENT HEALTH QUESTIONNAIRE - PHQ9
SUM OF ALL RESPONSES TO PHQ9 QUESTIONS 1 AND 2: 0
1. LITTLE INTEREST OR PLEASURE IN DOING THINGS: NOT AT ALL
2. FEELING DOWN, DEPRESSED OR HOPELESS: NOT AT ALL

## 2024-07-01 NOTE — PROGRESS NOTES
Subjective      No chief complaint on file.       42 yo male with h/o HTN and EtOH abuse (1 bottle vodka daily)  recently detoxing at home, last month presented to the hospital with dyspnea. He was diagnosed with COVID, found afib-rvr. We adopted a rate control strategy. He had an echo showing EF 40-45%. He went home on carvedilol and digoxin. We started Eliquis in anticipation of an outpatient cardioversion. As for his CMO, differential includes rate-related, EtOH, ischemic. He was cardioverted successfully, we were going to repeat an echocardiogram. He did raymond a CACS in the interim of 0.          Review of Systems   Constitutional: Negative for diaphoresis, fever, weight gain and weight loss.   Eyes:  Negative for visual disturbance.   Cardiovascular:  Negative for chest pain, claudication, dyspnea on exertion, irregular heartbeat, leg swelling, near-syncope, orthopnea, palpitations, paroxysmal nocturnal dyspnea and syncope.   Respiratory:  Negative for cough, shortness of breath and wheezing.    Musculoskeletal:  Negative for muscle weakness and myalgias.   Neurological:  Negative for dizziness and weakness.   All other systems reviewed and are negative.       Past Medical History:   Diagnosis Date    Anxiety     Atrial fibrillation (Multi)     Hyperlipidemia     Hypertension         Past Surgical History:   Procedure Laterality Date    CARDIAC ELECTROPHYSIOLOGY PROCEDURE N/A 2/6/2024    Procedure: Cardioversion;  Surgeon: Dago Josue DO;  Location: [facility] Cardiac Cath Lab;  Service: Cardiovascular;  Laterality: N/A;  no pre cert needed ref # [id number] per SHEI    FOOT TENDON SURGERY Right     Tendon reattachment surgery        Social History     Socioeconomic History    Marital status: Single     Spouse name: Not on file    Number of children: Not on file    Years of education: Not on file    Highest education level: Not on file   Occupational History    Not on file   Tobacco Use    Smoking status: Never     Smokeless tobacco: Never   Substance and Sexual Activity    Alcohol use: Not Currently    Drug use: Not Currently    Sexual activity: Defer   Other Topics Concern    Not on file   Social History Narrative    Not on file     Social Determinants of Health     Financial Resource Strain: Low Risk  (1/10/2024)    Overall Financial Resource Strain (CARDIA)     Difficulty of Paying Living Expenses: Not hard at all   Food Insecurity: Not on file   Transportation Needs: No Transportation Needs (1/10/2024)    PRAPARE - Transportation     Lack of Transportation (Medical): No     Lack of Transportation (Non-Medical): No   Physical Activity: Not on file   Stress: Not on file   Social Connections: Not on file   Intimate Partner Violence: Not on file   Housing Stability: Low Risk  (1/10/2024)    Housing Stability Vital Sign     Unable to Pay for Housing in the Last Year: No     Number of Places Lived in the Last Year: 1     Unstable Housing in the Last Year: No        Family History   Problem Relation Name Age of Onset    Hypertension Mother          OBJECTIVE:    Vitals:    07/01/24 1556   BP: 135/85   Pulse: 76   SpO2: 96%        Vitals reviewed.   Constitutional:       Appearance: Normal and healthy appearance. Not in distress.   Pulmonary:      Effort: Pulmonary effort is normal.      Breath sounds: Normal breath sounds.   Cardiovascular:      Normal rate. Regular rhythm. Normal S1. Normal S2.       Murmurs: There is no murmur.      No gallop.  No click.   Pulses:     Intact distal pulses.   Edema:     Peripheral edema absent.   Skin:     General: Skin is warm and dry.   Neurological:      General: No focal deficit present.          Lab Review:   Lab Results   Component Value Date    CHOL 169 09/13/2023    TRIG 38 (L) 09/13/2023    HDL 81 09/13/2023       Lab Results   Component Value Date    LDLCALC 80 09/13/2023        Atrial fibrillation (Multi)  Stable. Will check an echocardiogram, [id number] is 2 will continue AC for  now. Continue digoxin and carvedilol    Hypertension, unspecified type  Upper limits of normal. Continue current medical therapy

## 2024-07-01 NOTE — ASSESSMENT & PLAN NOTE
Stable. Will check an echocardiogram, [id number] is 2 will continue AC for now. Continue digoxin and carvedilol

## 2024-07-24 ENCOUNTER — HOSPITAL ENCOUNTER (OUTPATIENT)
Dept: CARDIOLOGY | Facility: HOSPITAL | Age: 44
Discharge: HOME | End: 2024-07-24
Payer: COMMERCIAL

## 2024-07-24 DIAGNOSIS — I48.0 PAROXYSMAL ATRIAL FIBRILLATION (MULTI): ICD-10-CM

## 2024-07-24 LAB
AORTIC VALVE MEAN GRADIENT: 6.3 MMHG
AORTIC VALVE PEAK VELOCITY: 1.73 M/S
AV PEAK GRADIENT: 12 MMHG
AVA (PEAK VEL): 1.83 CM2
AVA (VTI): 1.72 CM2
EJECTION FRACTION APICAL 4 CHAMBER: 54.6
EJECTION FRACTION: 63 %
LEFT VENTRICLE INTERNAL DIMENSION DIASTOLE: 5.87 CM (ref 3.5–6)
LEFT VENTRICULAR OUTFLOW TRACT DIAMETER: 1.98 CM
LV EJECTION FRACTION BIPLANE: 55 %
MITRAL VALVE E/A RATIO: 1.07
RIGHT VENTRICLE PEAK SYSTOLIC PRESSURE: 30.1 MMHG

## 2024-07-24 PROCEDURE — 93306 TTE W/DOPPLER COMPLETE: CPT

## 2024-07-24 PROCEDURE — 93306 TTE W/DOPPLER COMPLETE: CPT | Performed by: INTERNAL MEDICINE

## 2024-09-02 DIAGNOSIS — Z76.0 MEDICATION REFILL: ICD-10-CM

## 2024-09-02 DIAGNOSIS — I10 PRIMARY HYPERTENSION: ICD-10-CM

## 2024-09-03 RX ORDER — LISINOPRIL 20 MG/1
20 TABLET ORAL DAILY
Qty: 90 TABLET | Refills: 0 | Status: SHIPPED | OUTPATIENT
Start: 2024-09-03

## 2024-09-24 ENCOUNTER — TELEPHONE (OUTPATIENT)
Dept: PRIMARY CARE | Facility: CLINIC | Age: 44
End: 2024-09-24
Payer: COMMERCIAL

## 2024-09-24 DIAGNOSIS — Z13.6 SCREENING FOR CARDIOVASCULAR CONDITION: ICD-10-CM

## 2024-09-24 DIAGNOSIS — E55.9 VITAMIN D DEFICIENCY: Primary | ICD-10-CM

## 2024-09-24 DIAGNOSIS — Z00.00 PREVENTATIVE HEALTH CARE: ICD-10-CM

## 2024-09-24 NOTE — TELEPHONE ENCOUNTER
PT requesting lab work orders be placed prior to upcoming physical appointment 9/27/2024. Please call [phone]

## 2024-09-25 ENCOUNTER — LAB (OUTPATIENT)
Dept: LAB | Facility: LAB | Age: 44
End: 2024-09-25
Payer: COMMERCIAL

## 2024-09-25 DIAGNOSIS — E55.9 VITAMIN D DEFICIENCY: ICD-10-CM

## 2024-09-25 DIAGNOSIS — R74.01 TRANSAMINITIS: Primary | ICD-10-CM

## 2024-09-25 DIAGNOSIS — Z00.00 PREVENTATIVE HEALTH CARE: ICD-10-CM

## 2024-09-25 DIAGNOSIS — Z13.6 SCREENING FOR CARDIOVASCULAR CONDITION: ICD-10-CM

## 2024-09-25 DIAGNOSIS — E83.51 HYPOCALCEMIA: ICD-10-CM

## 2024-09-25 LAB
ALBUMIN SERPL BCP-MCNC: 4.4 G/DL (ref 3.4–5)
ALP SERPL-CCNC: 56 U/L (ref 33–120)
ALT SERPL W P-5'-P-CCNC: 87 U/L (ref 10–52)
ANION GAP SERPL CALCULATED.3IONS-SCNC: 15 MMOL/L (ref 10–20)
AST SERPL W P-5'-P-CCNC: 75 U/L (ref 9–39)
BILIRUB SERPL-MCNC: 1.2 MG/DL (ref 0–1.2)
BUN SERPL-MCNC: 15 MG/DL (ref 6–23)
CALCIUM SERPL-MCNC: 8.2 MG/DL (ref 8.6–10.3)
CHLORIDE SERPL-SCNC: 97 MMOL/L (ref 98–107)
CHOLEST SERPL-MCNC: 202 MG/DL (ref 0–199)
CHOLEST/HDLC SERPL: 2.7 {RATIO}
CO2 SERPL-SCNC: 28 MMOL/L (ref 21–32)
CREAT SERPL-MCNC: 0.7 MG/DL (ref 0.5–1.3)
EGFRCR SERPLBLD CKD-EPI 2021: >90 ML/MIN/1.73M*2
ERYTHROCYTE [DISTWIDTH] IN BLOOD BY AUTOMATED COUNT: 14 % (ref 11.5–14.5)
GLUCOSE SERPL-MCNC: 76 MG/DL (ref 74–99)
HCT VFR BLD AUTO: 36.6 % (ref 41–52)
HDLC SERPL-MCNC: 74.5 MG/DL
HGB BLD-MCNC: 12.3 G/DL (ref 13.5–17.5)
LDLC SERPL CALC-MCNC: 118 MG/DL
MCH RBC QN AUTO: 32.5 PG (ref 26–34)
MCHC RBC AUTO-ENTMCNC: 33.6 G/DL (ref 32–36)
MCV RBC AUTO: 97 FL (ref 80–100)
NON HDL CHOLESTEROL: 128 MG/DL (ref 0–149)
NRBC BLD-RTO: 0 /100 WBCS (ref 0–0)
PLATELET # BLD AUTO: 104 X10*3/UL (ref 150–450)
POTASSIUM SERPL-SCNC: 4.1 MMOL/L (ref 3.5–5.3)
PROT SERPL-MCNC: 7.1 G/DL (ref 6.4–8.2)
RBC # BLD AUTO: 3.79 X10*6/UL (ref 4.5–5.9)
SODIUM SERPL-SCNC: 136 MMOL/L (ref 136–145)
T4 FREE SERPL-MCNC: 1.02 NG/DL (ref 0.61–1.12)
TRIGL SERPL-MCNC: 50 MG/DL (ref 0–149)
TSH SERPL-ACNC: 4.05 MIU/L (ref 0.44–3.98)
VLDL: 10 MG/DL (ref 0–40)
WBC # BLD AUTO: 3.6 X10*3/UL (ref 4.4–11.3)

## 2024-09-25 PROCEDURE — 80061 LIPID PANEL: CPT

## 2024-09-25 PROCEDURE — 36415 COLL VENOUS BLD VENIPUNCTURE: CPT

## 2024-09-25 PROCEDURE — 84443 ASSAY THYROID STIM HORMONE: CPT

## 2024-09-25 PROCEDURE — 84439 ASSAY OF FREE THYROXINE: CPT

## 2024-09-25 PROCEDURE — 80053 COMPREHEN METABOLIC PANEL: CPT

## 2024-09-25 PROCEDURE — 85027 COMPLETE CBC AUTOMATED: CPT

## 2024-09-25 PROCEDURE — 82306 VITAMIN D 25 HYDROXY: CPT

## 2024-09-26 LAB — 25(OH)D3 SERPL-MCNC: 27 NG/ML (ref 30–100)

## 2024-09-27 ENCOUNTER — OFFICE VISIT (OUTPATIENT)
Dept: PRIMARY CARE | Facility: CLINIC | Age: 44
End: 2024-09-27
Payer: COMMERCIAL

## 2024-09-27 VITALS
HEIGHT: 71 IN | BODY MASS INDEX: 35.7 KG/M2 | WEIGHT: 255 LBS | HEART RATE: 77 BPM | SYSTOLIC BLOOD PRESSURE: 124 MMHG | OXYGEN SATURATION: 97 % | TEMPERATURE: 97.6 F | DIASTOLIC BLOOD PRESSURE: 80 MMHG

## 2024-09-27 DIAGNOSIS — Z00.00 ANNUAL PHYSICAL EXAM: Primary | ICD-10-CM

## 2024-09-27 DIAGNOSIS — L40.9 PSORIASIS: ICD-10-CM

## 2024-09-27 DIAGNOSIS — E55.9 VITAMIN D DEFICIENCY: ICD-10-CM

## 2024-09-27 DIAGNOSIS — Z02.89 ENCOUNTER FOR COMPLETION OF FORM WITH PATIENT: ICD-10-CM

## 2024-09-27 DIAGNOSIS — E03.8 SUBCLINICAL HYPOTHYROIDISM: ICD-10-CM

## 2024-09-27 DIAGNOSIS — H91.92 HEARING LOSS OF LEFT EAR, UNSPECIFIED HEARING LOSS TYPE: ICD-10-CM

## 2024-09-27 DIAGNOSIS — Z23 ENCOUNTER FOR IMMUNIZATION: ICD-10-CM

## 2024-09-27 DIAGNOSIS — Z71.85 VACCINE COUNSELING: ICD-10-CM

## 2024-09-27 PROCEDURE — 90471 IMMUNIZATION ADMIN: CPT | Performed by: STUDENT IN AN ORGANIZED HEALTH CARE EDUCATION/TRAINING PROGRAM

## 2024-09-27 PROCEDURE — 99214 OFFICE O/P EST MOD 30 MIN: CPT | Performed by: STUDENT IN AN ORGANIZED HEALTH CARE EDUCATION/TRAINING PROGRAM

## 2024-09-27 PROCEDURE — 3008F BODY MASS INDEX DOCD: CPT | Performed by: STUDENT IN AN ORGANIZED HEALTH CARE EDUCATION/TRAINING PROGRAM

## 2024-09-27 PROCEDURE — 99396 PREV VISIT EST AGE 40-64: CPT | Performed by: STUDENT IN AN ORGANIZED HEALTH CARE EDUCATION/TRAINING PROGRAM

## 2024-09-27 PROCEDURE — 90656 IIV3 VACC NO PRSV 0.5 ML IM: CPT | Performed by: STUDENT IN AN ORGANIZED HEALTH CARE EDUCATION/TRAINING PROGRAM

## 2024-09-27 PROCEDURE — 3074F SYST BP LT 130 MM HG: CPT | Performed by: STUDENT IN AN ORGANIZED HEALTH CARE EDUCATION/TRAINING PROGRAM

## 2024-09-27 PROCEDURE — 3079F DIAST BP 80-89 MM HG: CPT | Performed by: STUDENT IN AN ORGANIZED HEALTH CARE EDUCATION/TRAINING PROGRAM

## 2024-09-27 RX ORDER — ERGOCALCIFEROL 1.25 MG/1
50000 CAPSULE ORAL
Qty: 12 CAPSULE | Refills: 0 | Status: SHIPPED | OUTPATIENT
Start: 2024-09-27 | End: 2024-12-20

## 2024-09-27 RX ORDER — CLOBETASOL PROPIONATE 0.5 MG/G
CREAM TOPICAL 2 TIMES DAILY
Qty: 15 G | Refills: 0 | Status: SHIPPED | OUTPATIENT
Start: 2024-09-27 | End: 2025-05-25

## 2024-09-27 ASSESSMENT — PATIENT HEALTH QUESTIONNAIRE - PHQ9
1. LITTLE INTEREST OR PLEASURE IN DOING THINGS: NOT AT ALL
2. FEELING DOWN, DEPRESSED OR HOPELESS: NOT AT ALL
SUM OF ALL RESPONSES TO PHQ9 QUESTIONS 1 AND 2: 0

## 2024-09-27 ASSESSMENT — PAIN SCALES - GENERAL: PAINLEVEL: 0-NO PAIN
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
## 2024-09-27 NOTE — PATIENT INSTRUCTIONS
Thank you for coming to see me today.    Flu shot in clinic today.  I would also recommend that you get this year's updated COVID vaccination, this is available at most local pharmacies.    Paperwork completed today, copy scanned into your chart and original returned to you.    Dermatology and Audiology referrals entered today, call to schedule.    Clobetasol (topical steroid) sent to pharmacy for psoriasis treatment until you are able to be seen by dermatologist.    High dose vitamin D prescription sent to pharmacy.  Take once weekly as prescribed, recheck vitamin D level in 3 months - future lab ordered.    Future thyroid lab also ordered today for recheck of subclinical hypothyroidism in 6 months.    Routine follow-up with me in 6 months, sooner if needed.

## 2024-09-28 ENCOUNTER — APPOINTMENT (OUTPATIENT)
Dept: RADIOLOGY | Facility: HOSPITAL | Age: 44
End: 2024-09-28
Payer: COMMERCIAL

## 2024-09-28 ENCOUNTER — HOSPITAL ENCOUNTER (EMERGENCY)
Facility: HOSPITAL | Age: 44
Discharge: HOME | End: 2024-09-28
Attending: STUDENT IN AN ORGANIZED HEALTH CARE EDUCATION/TRAINING PROGRAM
Payer: COMMERCIAL

## 2024-09-28 ENCOUNTER — APPOINTMENT (OUTPATIENT)
Dept: CARDIOLOGY | Facility: HOSPITAL | Age: 44
End: 2024-09-28
Payer: COMMERCIAL

## 2024-09-28 VITALS
HEART RATE: 68 BPM | OXYGEN SATURATION: 97 % | BODY MASS INDEX: 35.7 KG/M2 | HEIGHT: 71 IN | SYSTOLIC BLOOD PRESSURE: 100 MMHG | DIASTOLIC BLOOD PRESSURE: 63 MMHG | TEMPERATURE: 98.4 F | RESPIRATION RATE: 18 BRPM | WEIGHT: 255 LBS
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
## 2024-09-28 DIAGNOSIS — E83.42 HYPOMAGNESEMIA: ICD-10-CM

## 2024-09-28 DIAGNOSIS — F10.220 ALCOHOL DEPENDENCE WITH UNCOMPLICATED INTOXICATION (MULTI): ICD-10-CM

## 2024-09-28 DIAGNOSIS — I95.9 HYPOTENSION, UNSPECIFIED HYPOTENSION TYPE: ICD-10-CM

## 2024-09-28 DIAGNOSIS — I95.1 ORTHOSTATIC HYPOTENSION: ICD-10-CM

## 2024-09-28 LAB
ALBUMIN SERPL BCP-MCNC: 3.8 G/DL (ref 3.4–5)
ALP SERPL-CCNC: 48 U/L (ref 33–120)
ALT SERPL W P-5'-P-CCNC: 70 U/L (ref 10–52)
ANION GAP SERPL CALCULATED.3IONS-SCNC: 13 MMOL/L (ref 10–20)
AST SERPL W P-5'-P-CCNC: 55 U/L (ref 9–39)
BASOPHILS # BLD AUTO: 0.05 X10*3/UL (ref 0–0.1)
BASOPHILS NFR BLD AUTO: 1.4 %
BILIRUB SERPL-MCNC: 0.3 MG/DL (ref 0–1.2)
BUN SERPL-MCNC: 16 MG/DL (ref 6–23)
CALCIUM SERPL-MCNC: 8 MG/DL (ref 8.6–10.3)
CHLORIDE SERPL-SCNC: 109 MMOL/L (ref 98–107)
CO2 SERPL-SCNC: 23 MMOL/L (ref 21–32)
CREAT SERPL-MCNC: 1.3 MG/DL (ref 0.5–1.3)
EGFRCR SERPLBLD CKD-EPI 2021: 69 ML/MIN/1.73M*2
EOSINOPHIL # BLD AUTO: 0.12 X10*3/UL (ref 0–0.7)
EOSINOPHIL NFR BLD AUTO: 3.4 %
ERYTHROCYTE [DISTWIDTH] IN BLOOD BY AUTOMATED COUNT: 14.6 % (ref 11.5–14.5)
ETHANOL SERPL-MCNC: 349 MG/DL
GLUCOSE SERPL-MCNC: 103 MG/DL (ref 74–99)
HCT VFR BLD AUTO: 36.7 % (ref 41–52)
HGB BLD-MCNC: 12.3 G/DL (ref 13.5–17.5)
IMM GRANULOCYTES # BLD AUTO: 0.01 X10*3/UL (ref 0–0.7)
IMM GRANULOCYTES NFR BLD AUTO: 0.3 % (ref 0–0.9)
LYMPHOCYTES # BLD AUTO: 1.47 X10*3/UL (ref 1.2–4.8)
LYMPHOCYTES NFR BLD AUTO: 41.2 %
MAGNESIUM SERPL-MCNC: 1.52 MG/DL (ref 1.6–2.4)
MCH RBC QN AUTO: 33.1 PG (ref 26–34)
MCHC RBC AUTO-ENTMCNC: 33.5 G/DL (ref 32–36)
MCV RBC AUTO: 99 FL (ref 80–100)
MONOCYTES # BLD AUTO: 0.52 X10*3/UL (ref 0.1–1)
MONOCYTES NFR BLD AUTO: 14.6 %
NEUTROPHILS # BLD AUTO: 1.4 X10*3/UL (ref 1.2–7.7)
NEUTROPHILS NFR BLD AUTO: 39.1 %
NRBC BLD-RTO: 0 /100 WBCS (ref 0–0)
PLATELET # BLD AUTO: 121 X10*3/UL (ref 150–450)
POTASSIUM SERPL-SCNC: 3.8 MMOL/L (ref 3.5–5.3)
PROT SERPL-MCNC: 6.7 G/DL (ref 6.4–8.2)
RBC # BLD AUTO: 3.72 X10*6/UL (ref 4.5–5.9)
SODIUM SERPL-SCNC: 141 MMOL/L (ref 136–145)
WBC # BLD AUTO: 3.6 X10*3/UL (ref 4.4–11.3)

## 2024-09-28 PROCEDURE — 96365 THER/PROPH/DIAG IV INF INIT: CPT

## 2024-09-28 PROCEDURE — 72125 CT NECK SPINE W/O DYE: CPT

## 2024-09-28 PROCEDURE — 36415 COLL VENOUS BLD VENIPUNCTURE: CPT | Performed by: STUDENT IN AN ORGANIZED HEALTH CARE EDUCATION/TRAINING PROGRAM

## 2024-09-28 PROCEDURE — 2500000001 HC RX 250 WO HCPCS SELF ADMINISTERED DRUGS (ALT 637 FOR MEDICARE OP): Performed by: STUDENT IN AN ORGANIZED HEALTH CARE EDUCATION/TRAINING PROGRAM

## 2024-09-28 PROCEDURE — 71045 X-RAY EXAM CHEST 1 VIEW: CPT | Performed by: RADIOLOGY

## 2024-09-28 PROCEDURE — 72125 CT NECK SPINE W/O DYE: CPT | Performed by: STUDENT IN AN ORGANIZED HEALTH CARE EDUCATION/TRAINING PROGRAM

## 2024-09-28 PROCEDURE — 2500000004 HC RX 250 GENERAL PHARMACY W/ HCPCS (ALT 636 FOR OP/ED): Performed by: STUDENT IN AN ORGANIZED HEALTH CARE EDUCATION/TRAINING PROGRAM

## 2024-09-28 PROCEDURE — 70450 CT HEAD/BRAIN W/O DYE: CPT | Performed by: STUDENT IN AN ORGANIZED HEALTH CARE EDUCATION/TRAINING PROGRAM

## 2024-09-28 PROCEDURE — 96366 THER/PROPH/DIAG IV INF ADDON: CPT

## 2024-09-28 PROCEDURE — 70450 CT HEAD/BRAIN W/O DYE: CPT

## 2024-09-28 PROCEDURE — 85025 COMPLETE CBC W/AUTO DIFF WBC: CPT | Performed by: STUDENT IN AN ORGANIZED HEALTH CARE EDUCATION/TRAINING PROGRAM

## 2024-09-28 PROCEDURE — 96375 TX/PRO/DX INJ NEW DRUG ADDON: CPT

## 2024-09-28 PROCEDURE — 93005 ELECTROCARDIOGRAM TRACING: CPT

## 2024-09-28 PROCEDURE — 80053 COMPREHEN METABOLIC PANEL: CPT | Performed by: STUDENT IN AN ORGANIZED HEALTH CARE EDUCATION/TRAINING PROGRAM

## 2024-09-28 PROCEDURE — 82077 ASSAY SPEC XCP UR&BREATH IA: CPT | Performed by: STUDENT IN AN ORGANIZED HEALTH CARE EDUCATION/TRAINING PROGRAM

## 2024-09-28 PROCEDURE — 71045 X-RAY EXAM CHEST 1 VIEW: CPT

## 2024-09-28 PROCEDURE — 99285 EMERGENCY DEPT VISIT HI MDM: CPT

## 2024-09-28 PROCEDURE — 83735 ASSAY OF MAGNESIUM: CPT | Performed by: STUDENT IN AN ORGANIZED HEALTH CARE EDUCATION/TRAINING PROGRAM

## 2024-09-28 RX ORDER — FOLIC ACID 1 MG/1
1 TABLET ORAL DAILY
Status: DISCONTINUED | OUTPATIENT
Start: 2024-09-28 | End: 2024-09-28 | Stop reason: HOSPADM

## 2024-09-28 RX ORDER — THIAMINE HYDROCHLORIDE 100 MG/ML
100 INJECTION, SOLUTION INTRAMUSCULAR; INTRAVENOUS DAILY
Status: DISCONTINUED | OUTPATIENT
Start: 2024-09-28 | End: 2024-09-28 | Stop reason: HOSPADM

## 2024-09-28 RX ORDER — DIAZEPAM 5 MG/1
10 TABLET ORAL EVERY 2 HOUR PRN
Status: DISCONTINUED | OUTPATIENT
Start: 2024-09-28 | End: 2024-09-28 | Stop reason: HOSPADM

## 2024-09-28 RX ORDER — MAGNESIUM SULFATE HEPTAHYDRATE 40 MG/ML
2 INJECTION, SOLUTION INTRAVENOUS ONCE
Status: COMPLETED | OUTPATIENT
Start: 2024-09-28 | End: 2024-09-28

## 2024-09-28 RX ORDER — MULTIVIT-MIN/IRON FUM/FOLIC AC 7.5 MG-4
1 TABLET ORAL DAILY
Status: DISCONTINUED | OUTPATIENT
Start: 2024-09-28 | End: 2024-09-28 | Stop reason: HOSPADM

## 2024-09-28 RX ORDER — LANOLIN ALCOHOL/MO/W.PET/CERES
100 CREAM (GRAM) TOPICAL DAILY
Status: DISCONTINUED | OUTPATIENT
Start: 2024-10-01 | End: 2024-09-28 | Stop reason: HOSPADM

## 2024-09-28 RX ADMIN — FOLIC ACID 1 MG: 1 TABLET ORAL at 17:52

## 2024-09-28 RX ADMIN — Medication 1 TABLET: at 17:52

## 2024-09-28 RX ADMIN — MAGNESIUM SULFATE HEPTAHYDRATE 2 G: 40 INJECTION, SOLUTION INTRAVENOUS at 17:50

## 2024-09-28 RX ADMIN — THIAMINE HYDROCHLORIDE 100 MG: 100 INJECTION, SOLUTION INTRAMUSCULAR; INTRAVENOUS at 17:55

## 2024-09-28 RX ADMIN — SODIUM CHLORIDE 2000 ML: 900 INJECTION, SOLUTION INTRAVENOUS at 17:52

## 2024-09-28 ASSESSMENT — COLUMBIA-SUICIDE SEVERITY RATING SCALE - C-SSRS
6. HAVE YOU EVER DONE ANYTHING, STARTED TO DO ANYTHING, OR PREPARED TO DO ANYTHING TO END YOUR LIFE?: NO
2. HAVE YOU ACTUALLY HAD ANY THOUGHTS OF KILLING YOURSELF?: NO
1. IN THE PAST MONTH, HAVE YOU WISHED YOU WERE DEAD OR WISHED YOU COULD GO TO SLEEP AND NOT WAKE UP?: NO

## 2024-09-28 ASSESSMENT — PAIN - FUNCTIONAL ASSESSMENT: PAIN_FUNCTIONAL_ASSESSMENT: 0-10

## 2024-09-28 ASSESSMENT — PAIN SCALES - GENERAL: PAINLEVEL_OUTOF10: 0 - NO PAIN

## 2024-09-28 NOTE — ED TRIAGE NOTES
Pt under the influence of ETOH, Pt fell today, unwittnessed. Pts wife called 911, pt on elaquis for Aifb.

## 2024-09-28 NOTE — DISCHARGE INSTRUCTIONS
You were evaluated for multiple falls and alcohol taxation.     During your visit in the emergency department you were evaluated for your presenting symptoms.  Based on the extensive workup you received,  all efforts were made to identify the source of your symptoms and identify any life-threatening conditions.  These life-threatening conditions that were attempted to be identified and medically managed/treated include but not limited to significant electrolyte/metabolic derangement/abnormality, traumatic bleeding in your head, malignant cardiac arrhythmia,.  Additionally, you may be experiencing symptoms that are non-life-threatening but are uncomfortable and disruptive to your everyday life.  All efforts were made to manage your symptoms while in the emergency department along with appropriate at home therapy for symptomatic management during your recovery. Please be aware that not all of the conditions explained/discussed in these discharge instructions are applicable to your condition but encompass many of the conditions that were evaluated for during your ED encounter.      During your evaluation and assessment, all measures were taken to evaluate you and address your health concerns to identify dangerous and life threatening health conditions. It is not possible to identify all health conditions or pathologies and eliminate any chance of unfavorable outcomes while in the Emergency Department. My team encourages you to be vigilant with your health and follow-up with the appropriate providers outlined in your discharge paperwork. Please return to the Emergency Department if you feel that your health has not improved or experiencing worsening symptoms.    Special instructions please make follow-up appoint with your primary care physician to further manage her symptoms address your concerns.  Please read your discharge instructions to be aware of alcohol withdrawal symptoms.    Incidental findings:  None

## 2024-09-28 NOTE — ED PROVIDER NOTES
HPI   Chief Complaint   Patient presents with    Fall       Patient presents to ED by EMS for reported multiple falls and questionable syncopal episodes.  Per EMS and was called by girlfriend for reported multiple falls and specifically incident of significant AMS and decreased breathing.  EMS notes patient is intoxicated, takes Eliquis for A-fib, NSR on telemetry, noted to be hypotensive 70-80/50-60 and received 500 NS.  EMS notes patient does not endorse any significant symptoms and was no reported significant prodromal/antecedent symptoms.  Per significant other patient did fall earlier in the day without any concerning symptoms and fell back to sleep on the couch, got up and fell again as she was helping him walk and appeared to blue and gasping for air with decreased mentation for about 2 to 3 minutes.  She notes no history of alcohol withdrawal seizures.  Significant other does not endorse any abnormal health complaints prior to event and had a full checkup by PCP yesterday.  Patient does not endorse any specific symptoms and denies any injuries or pain.  Patient does endorse consuming alcohol today, primarily vodka primarily numerous drinks per day.  Has experienced alcohol withdrawal symptoms in past but does not recall last events or last withdrawal seizure episodes.      History provided by:  Significant other and EMS personnel          Patient History   Past Medical History:   Diagnosis Date    Anxiety     Atrial fibrillation (Multi)     Hyperlipidemia     Hypertension      Past Surgical History:   Procedure Laterality Date    CARDIAC ELECTROPHYSIOLOGY PROCEDURE N/A 2/6/2024    Procedure: Cardioversion;  Surgeon: Dago Josue DO;  Location: [facility] Cardiac Cath Lab;  Service: Cardiovascular;  Laterality: N/A;  no pre cert needed ref # [id number] per SHEI    FOOT TENDON SURGERY Right     Tendon reattachment surgery     Family History   Problem Relation Name Age of Onset    Hypertension Mother       Social  "History     Tobacco Use    Smoking status: Never    Smokeless tobacco: Never   Substance Use Topics    Alcohol use: Not Currently    Drug use: Not Currently       Physical Exam   /63 (BP Location: Left arm, Patient Position: Lying)   Pulse 68   Temp 36.9 °C (98.4 °F)   Resp 18   Ht 1.803 m (5' 11\")   Wt 116 kg (255 lb)   SpO2 97%   BMI 35.57 kg/m²       Physical Exam  Vitals and nursing note reviewed.   Constitutional:       General: He is not in acute distress.     Appearance: Normal appearance. He is normal weight. He is not ill-appearing.   HENT:      Head: Normocephalic and atraumatic.      Nose: Nose normal.      Mouth/Throat:      Mouth: Mucous membranes are moist.      Pharynx: Oropharynx is clear.   Eyes:      General: No scleral icterus.     Conjunctiva/sclera: Conjunctivae normal.      Pupils: Pupils are equal, round, and reactive to light.      Comments: Pupils 5 to 2 mm equal and symmetrically reactive, no significant conjunctival injection   Cardiovascular:      Rate and Rhythm: Normal rate and regular rhythm.      Pulses:           Radial pulses are 2+ on the right side and 2+ on the left side.        Dorsalis pedis pulses are 2+ on the right side and 2+ on the left side.      Heart sounds: Normal heart sounds. Heart sounds not distant.   Pulmonary:      Effort: Pulmonary effort is normal. No respiratory distress.      Breath sounds: Normal breath sounds and air entry. No decreased air movement. No decreased breath sounds.      Comments: Not requiring supplemental O2, no evidence of respiratory distress  Chest:      Chest wall: No tenderness.      Comments: No evidence of pain with palpation, no crepitus/deformities, no paradoxical chest wall movement respiratory cycle/effort  Abdominal:      General: Abdomen is protuberant.      Palpations: Abdomen is soft.      Tenderness: There is no abdominal tenderness.      Comments: No tenderness palpation   Musculoskeletal:      Right lower leg: No " edema.      Left lower leg: No edema.   Skin:     General: Skin is warm and dry.      Coloration: Skin is not jaundiced.      Findings: Rash present. Rash is scaling. Rash is not papular, urticarial or vesicular.      Comments: Eczematous rash throughout BUE and thoracic region   Neurological:      General: No focal deficit present.      Mental Status: He is alert and oriented to person, place, and time.      Cranial Nerves: Cranial nerves 2-12 are intact.      Sensory: Sensation is intact.      Motor: Motor function is intact.      Coordination: Coordination is intact.      Comments: ANO x 4, does appear to be intoxicated, gross motor/strength sensation intact x 4, no significant asterixis           ED Course & MDM   ED Course as of 09/29/24 1426   Sat Sep 28, 2024   1540 VS notable for hypotensive although asymptomatic on presentation in setting of reported fall and EtOH use, reported multiple syncopal episodes today, remaining VSS [BC]   1542 I personally reviewed and interpreted the EKG @1542: NSR 69, normal axis/intervals and no appreciable ischemia, and prior EKG on 1/8/2024 reviewed without any appreciable specific/identifiable changes. [BC]   1633 CBC and Auto Differential(!)  Mild leukopenia that is baseline compared to most recent labs although lower to labs months before, mildly improved thrombocytopenia, baseline normocytic anemia in the setting of syncope [BC]   1719 Comprehensive metabolic panel(!)  Unremarkable and noncontributory to Patient condition/symptoms [BC]   1719 Magnesium(!)  Mild hypomagnesemia likely secondary to poor nutritional intake [BC]   1729 CT head wo IV contrast  IMPRESSION:  CT HEAD:  1. No acute intracranial abnormality or calvarial fracture.    I have personally reviewed and interpreted the images, no evidence of acute intracranial processes, clinical evidence of no evidence traumatic ICH, agree with radiology final read [BC]   1730 CT cervical spine wo IV  contrast  IMPRESSION:  CT HEAD:  1. No acute intracranial abnormality or calvarial fracture.   [BC]   1730 XR chest 1 view  IMPRESSION:  No evidence of acute intrathoracic abnormality.   [BC]   1827 Ethanol(!)  Significantly elevated in the setting of EtOH desiccation and multiple falls today with reported hypoxic event, hypotensive per EMS [BC]      ED Course User Index  [BC] Iasc Dowling MD         Diagnoses as of 09/29/24 1426   Fall, initial encounter   Alcohol dependence with uncomplicated intoxication (Multi)   Hypotension, unspecified hypotension type   Orthostatic hypotension   Hypomagnesemia                 No data recorded     [place] Coma Scale Score: 15 (09/28/24 1537 : Loretta Zaragoza, RN)                           Medical Decision Making  Patient presented to the ED for EtOH use/dependency and presently intoxicated, multiple falls with described decreased respiratory effort, cyanosis with concerning PMHx of EtOH use/dependency, EtOH withdrawal symptoms complicated by seizures, A-fib on Eliquis, HTN, HLD, cardiomyopathy.  I personally reviewed and interpreted VS, labs, images, and EKG which are as stated above in the ED course.    Assessment/evaluation multifactorial cystoscopy with EtOH intoxication with potential intermittent A-fib with RVR resulting in generalized weakness and brief/transient syncopal episodes, likely poor nutritional intake complicated by significant dehydration with likely severely symptomatic orthostatic hypotensive syncopal episodes.  No concerning history, clinical evidence/work-up, or exam findings for the considered differentials of malignant arrhythmia, significant electrolyte/metabolic derangement, traumatic ICH/encephalopathy, worsening normocytic anemia, traumatic cervical vertebral fracture/malalignment, PTX, aspiration pneumonitis, focal/multifocal PNA.  These conditions have been thoroughly evaluated and determined to be sufficiently unlikely to be the etiology  of patient's presenting symptoms.      Prescribed none.  After receiving an appropriate exam, clinical work-up, and necessary interventions/treatment, Patient is appropriate for discharge at this time due to no concerning symptoms or findings requiring hospitalization for stabilization or further interrogation/management and is appropriate for management of symptoms at home with recommended appropriate outpatient follow-up.  Patient was encouraged to ask any questions or for clarification of today's ED encounter.  Patient is agreeable to plan of care. Discussed with Patient today's results/findings and likely diagnosis, provided appropriate RTED precautions along with recommended follow-up with PCP.  Shared decision making during patient's health given multiple falls, hypotensive on arrival and known history of alcohol use/dependency disorder complicated by withdrawal symptoms and seizures, patient felt he was stable for discharge with sober ride home with significant other, discussed workup findings with significant other, and concerns as well, patient states he does not want to stay given he is to work on Monday.  Patient demonstrated full understanding of providers concerns along with full capacity make medical decision, did not appear intoxicated will have a conversation.    Per Chart Review: Cardiology admission on 2/6/2024 for A-fib, was attempted to obtain rate control by medications, underwent electrocardioversion x 1 with success, discharged home appropriate follow-up directions, LOS 1 day.      Parts of this chart have been completed using voice-to-tect recognition software. Please excuse any errors of transcription that were missed for editing/correcting.    Problems Addressed:  Alcohol dependence with uncomplicated intoxication (Multi): chronic illness or injury  Fall, initial encounter: undiagnosed new problem with uncertain prognosis  Hypotension, unspecified hypotension type: self-limited or minor  problem    Amount and/or Complexity of Data Reviewed  External Data Reviewed: notes.     Details: See MDM  Labs: ordered. Decision-making details documented in ED Course.  Radiology: ordered and independent interpretation performed. Decision-making details documented in ED Course.  ECG/medicine tests: ordered and independent interpretation performed. Decision-making details documented in ED Course.        Procedure  Procedures     Isac Dowling MD  09/29/24 2979

## 2024-10-02 LAB
ATRIAL RATE: 69 BPM
P AXIS: 59 DEGREES
P OFFSET: 181 MS
P ONSET: 126 MS
PR INTERVAL: 178 MS
Q ONSET: 215 MS
QRS COUNT: 11 BEATS
QRS DURATION: 96 MS
QT INTERVAL: 408 MS
QTC CALCULATION(BAZETT): 437 MS
QTC FREDERICIA: 427 MS
R AXIS: 63 DEGREES
T AXIS: 56 DEGREES
T OFFSET: 419 MS
VENTRICULAR RATE: 69 BPM

## 2024-10-08 ENCOUNTER — APPOINTMENT (OUTPATIENT)
Dept: PRIMARY CARE | Facility: CLINIC | Age: 44
End: 2024-10-08
Payer: COMMERCIAL

## 2024-10-21 ENCOUNTER — APPOINTMENT (OUTPATIENT)
Dept: CARDIOLOGY | Facility: CLINIC | Age: 44
End: 2024-10-21
Payer: COMMERCIAL

## 2024-10-29 ENCOUNTER — CLINICAL SUPPORT (OUTPATIENT)
Dept: AUDIOLOGY | Facility: CLINIC | Age: 44
End: 2024-10-29
Payer: COMMERCIAL

## 2024-10-29 DIAGNOSIS — H90.3 SENSORINEURAL HEARING LOSS (SNHL) OF BOTH EARS: Primary | ICD-10-CM

## 2024-10-29 DIAGNOSIS — H91.92 HEARING LOSS OF LEFT EAR, UNSPECIFIED HEARING LOSS TYPE: ICD-10-CM

## 2024-10-29 PROCEDURE — 92550 TYMPANOMETRY & REFLEX THRESH: CPT

## 2024-10-29 PROCEDURE — 92557 COMPREHENSIVE HEARING TEST: CPT

## 2024-12-17 ENCOUNTER — APPOINTMENT (OUTPATIENT)
Dept: CARDIOLOGY | Facility: CLINIC | Age: 44
End: 2024-12-17
Payer: COMMERCIAL

## 2025-01-21 DIAGNOSIS — I48.91 ATRIAL FIBRILLATION, UNSPECIFIED TYPE (MULTI): ICD-10-CM

## 2025-02-27 DIAGNOSIS — I10 PRIMARY HYPERTENSION: ICD-10-CM

## 2025-02-27 DIAGNOSIS — I48.91 ATRIAL FIBRILLATION, UNSPECIFIED TYPE (MULTI): ICD-10-CM

## 2025-02-27 RX ORDER — CARVEDILOL PHOSPHATE 80 MG/1
80 CAPSULE, EXTENDED RELEASE ORAL DAILY
Qty: 90 CAPSULE | Refills: 3 | Status: SHIPPED | OUTPATIENT
Start: 2025-02-27 | End: 2026-02-27

## 2025-02-27 RX ORDER — LISINOPRIL 20 MG/1
20 TABLET ORAL DAILY
Qty: 90 TABLET | Refills: 0 | Status: SHIPPED | OUTPATIENT
Start: 2025-02-27

## 2025-02-27 NOTE — TELEPHONE ENCOUNTER
Prescription request received and populated   Pharmacy populated  Last Office Visit: 9/27/24 for physical

## 2025-03-15 DIAGNOSIS — E55.9 VITAMIN D DEFICIENCY: ICD-10-CM

## 2025-03-17 RX ORDER — ERGOCALCIFEROL 1.25 MG/1
50000 CAPSULE ORAL
Qty: 12 CAPSULE | Refills: 0 | OUTPATIENT
Start: 2025-03-23

## 2025-03-17 NOTE — TELEPHONE ENCOUNTER
Rx declined.  Overdue for repeat vitamin D lab to determine if high-dose supplement Rx is still needed.  Order previously entered in epic.

## 2025-04-03 DIAGNOSIS — I48.91 ATRIAL FIBRILLATION, UNSPECIFIED TYPE (MULTI): ICD-10-CM

## 2025-04-03 DIAGNOSIS — I10 PRIMARY HYPERTENSION: ICD-10-CM

## 2025-04-04 RX ORDER — LISINOPRIL 20 MG/1
20 TABLET ORAL DAILY
Qty: 90 TABLET | Refills: 1 | Status: SHIPPED | OUTPATIENT
Start: 2025-04-04

## 2025-04-04 RX ORDER — APIXABAN 5 MG/1
5 TABLET, FILM COATED ORAL EVERY 12 HOURS
Qty: 60 TABLET | Refills: 0 | Status: SHIPPED | OUTPATIENT
Start: 2025-04-04

## 2025-04-04 RX ORDER — DIGOXIN 125 MCG
125 TABLET ORAL DAILY
Qty: 90 TABLET | Refills: 3 | Status: SHIPPED | OUTPATIENT
Start: 2025-04-04

## 2025-04-15 ENCOUNTER — APPOINTMENT (OUTPATIENT)
Dept: DERMATOLOGY | Facility: CLINIC | Age: 45
End: 2025-04-15
Payer: COMMERCIAL

## 2025-04-15 DIAGNOSIS — Z79.899 PHARMACOLOGIC THERAPY: ICD-10-CM

## 2025-04-15 DIAGNOSIS — L40.9 PSORIASIS: Primary | ICD-10-CM

## 2025-04-15 DIAGNOSIS — L40.50 PSORIATIC ARTHRITIS (MULTI): ICD-10-CM

## 2025-04-15 PROCEDURE — 1036F TOBACCO NON-USER: CPT | Performed by: NURSE PRACTITIONER

## 2025-04-15 PROCEDURE — 99204 OFFICE O/P NEW MOD 45 MIN: CPT | Performed by: NURSE PRACTITIONER

## 2025-04-15 ASSESSMENT — DERMATOLOGY QUALITY OF LIFE (QOL) ASSESSMENT
WHAT SINGLE SKIN CONDITION LISTED BELOW IS THE PATIENT ANSWERING THE QUALITY-OF-LIFE ASSESSMENT QUESTIONS ABOUT: PSORIASIS
RATE HOW BOTHERED YOU ARE BY SYMPTOMS OF YOUR SKIN PROBLEM (EG, ITCHING, STINGING BURNING, HURTING OR SKIN IRRITATION): 6 - ALWAYS BOTHERED
RATE HOW EMOTIONALLY BOTHERED YOU ARE BY YOUR SKIN PROBLEM (FOR EXAMPLE, WORRY, EMBARRASSMENT, FRUSTRATION): 6 - ALWAYS BOTHERED
RATE HOW BOTHERED YOU ARE BY EFFECTS OF YOUR SKIN PROBLEMS ON YOUR ACTIVITIES (EG, GOING OUT, ACCOMPLISHING WHAT YOU WANT, WORK ACTIVITIES OR YOUR RELATIONSHIPS WITH OTHERS): 5

## 2025-04-15 ASSESSMENT — PATIENT GLOBAL ASSESSMENT (PGA): PATIENT GLOBAL ASSESSMENT: PATIENT GLOBAL ASSESSMENT:  4 - SEVERE

## 2025-04-15 ASSESSMENT — PHYSICIAN GLOBAL ASSESSMENT (PGA): WHAT IS THE PGA: PHYSICIAN GLOBAL ASSESSMENT:  4 - SEVERE

## 2025-04-15 ASSESSMENT — BODY SURFACE AREA (BSA): WHAT IS THE BSA PERCENTAGE: >10% BODY SURFACE AREA INVOLVED

## 2025-04-15 ASSESSMENT — ITCH NUMERIC RATING SCALE: HOW SEVERE IS YOUR ITCHING?: 6

## 2025-04-15 NOTE — PROGRESS NOTES
"Subjective     Justo Whitehead is a 44 y.o. male who presents for the following: Psoriasis.     Has tried: Methotrexate for about 1-2 months in 2022 or 2023, did not like how the patient made him feel. Clobetasol ointment - did not work.     Dx with PsA per Dr. Morin    Review of Systems:  No other skin or systemic complaints other than what is documented elsewhere in the note.    The following portions of the chart were reviewed this encounter and updated as appropriate:          Skin Cancer History  No skin cancer on file.      Specialty Problems    None       Objective   Well appearing patient in no apparent distress; mood and affect are within normal limits.    A focused skin examination was performed waist up and legs. All findings within normal limits unless otherwise noted below.    Assessment/Plan   1. Psoriasis  Well-demarcated erythematous papules and plaques with overlying silvery scale. 45% BSA    Psoriasis is a common, noncontagious condition that can present in a variety of ways in the skin. The subtypes of this condition include plaque, inverse (or skin fold), guttate, erythrodermic, and pustular psoriasis. Plaque psoriasis, which represents approximately 85% of psoriasis cases, is a lifelong skin condition that affects about 2%-3% of the population worldwide.     While the exact cause of psoriasis is unknown, this condition is the result of an overactive immune system that attacks the skin and other organs of the body. Psoriasis is very common in some families, suggesting a likely genetic component contributing to this disease, but it can also occur in individuals with no family history of psoriasis. Psoriasis can be triggered by certain environmental causes, such as emotional stress, pregnancy, injury to the skin, bacterial skin infections such as a streptococcal infection (\"strep\"), smoking or alcohol consumption, and ingesting certain medications (e.g., some blood pressure medications, lithium, " prednisone).    Approximately one-third of people with plaque psoriasis also develop psoriatic arthritis, an inflammatory joint condition that causes painful, swollen joints and can lead to irreversible joint destruction. Importantly, individuals with psoriasis are also at increased risk for other health conditions, such as heart disease, stroke, high blood pressure, diabetes, obesity, sleep problems, anxiety / depression, social stigma, cancer, and even death.      SELF CARE GUIDELINES  Bathe daily to help soften scales and moisten the skin. Avoid harsh soaps and scrubbing the skin as these may worsen psoriasis. Moisturizing soaps and soap substitutes, such as unscented Dove Sensitive Skin Beauty Bar, Vanicream Cleansing Bar, and CeraVe Psoriasis Cleanser, are milder products for the skin.  Small doses of natural sunlight may be helpful, such as 10-15 minutes approximately 2 or 3 times per week. Avoid too much sun; however, and protect your healthy skin from excessive sun exposure to help prevent premature aging of the skin and skin cancers.  Smoking cessation: If you are a smoker, smoking cessation may possibly reduce the severity (frequency of flares, duration of flares, severity of flares) of HS. If you are  a smoker, I recommend working with your primary care provider to consider treatment options for nicotine dependence  Weight loss can some also help with reducing the severity of psoriasis.     TREATMENT OPTIONS    The following treatments (risks, benefits, alteratives) were reviewed as possible treatment options:  Phototherapy (Narrow band Ultraviolet B therapy)  Topical steroids (Hydrocortisone, Triamcinolone, Clobetasol)   Vtama  Zoryve  Topical calcineurin inhibitors (tacrolimus, pimecrolimus)  Otezla  Acitretin  Methotrexate  Humira/Cimzia/Enbrel  Cosentyx/Taltz  Skyrizi/Tremfya      PLAN    Plan to start Risankizumab(Skyrizi)/Guselkumab (Tremfrya):   The biologics checklist was reviewed with the  patient. Negative for personal/family history: TB. Chronic infections. Lupus. Multiple sclerosis. No heart failure. Lymphoma/Cancer. Liver disease. Kidney disease. Blood cell disease. GI disease. Pregnant/Nursing/Trying. Lung disease.  Labs today: HIV, HCV, HBV, T spot, CBC and CMP  Discussed risks including injection site reaction, headache, yeast infections, drug induced liver injury, joint pain, and rash.   While IL 23 inhibitors are immunosuppressive in nature, and other biologics have increased risk of cancer due to your bodies immune system not working optimally, clinical studies for IL 23 inhibitors showed no significant increase in risk of cancer. Regardless, I encourage you to continue to stay up to date on age appropriate screenings.      There is an increased risk of serious infection while taking Skyrizi.   Given the increased risk of infection, I recommend you get up to date on age appropriate vaccinations and also seasonal vaccinations (Flu, COVID 19).  Stop taking Skyrizi and notify me if:  You develop an infection (urinary tract infection, cold, skin infection, tooth infection, etc...).  New diagnosis of cancer  New rash onset      Related Procedures  Comprehensive Metabolic Panel  CBC and Auto Differential  T-Spot TB  Hepatitis B Core Antibody, Total  Hepatitis C Antibody  HIV 1/2 Antigen/Antibody Screen with Reflex to Confirmation  Hepatitis B Surface Antigen    Related Medications  clobetasol (Temovate) 0.05 % cream  Apply topically 2 times a day.    2. Pharmacologic therapy    Related Procedures  Comprehensive Metabolic Panel  CBC and Auto Differential  T-Spot TB  Hepatitis B Core Antibody, Total  Hepatitis C Antibody  HIV 1/2 Antigen/Antibody Screen with Reflex to Confirmation  Hepatitis B Surface Antigen    3. Psoriatic arthritis (Multi)    Dx with PsA per Rheumatology but denies any current joint pain issues.         Return in 4 months for recheck.

## 2025-04-18 LAB
ALBUMIN SERPL-MCNC: 4.8 G/DL (ref 3.6–5.1)
ALP SERPL-CCNC: 48 U/L (ref 36–130)
ALT SERPL-CCNC: 97 U/L (ref 9–46)
ANION GAP SERPL CALCULATED.4IONS-SCNC: 9 MMOL/L (CALC) (ref 7–17)
AST SERPL-CCNC: 88 U/L (ref 10–40)
BASOPHILS # BLD AUTO: 38 CELLS/UL (ref 0–200)
BASOPHILS NFR BLD AUTO: 1.2 %
BILIRUB SERPL-MCNC: 0.7 MG/DL (ref 0.2–1.2)
BUN SERPL-MCNC: 13 MG/DL (ref 7–25)
CALCIUM SERPL-MCNC: 9.1 MG/DL (ref 8.6–10.3)
CHLORIDE SERPL-SCNC: 97 MMOL/L (ref 98–110)
CO2 SERPL-SCNC: 33 MMOL/L (ref 20–32)
CREAT SERPL-MCNC: 0.83 MG/DL (ref 0.6–1.29)
EGFRCR SERPLBLD CKD-EPI 2021: 110 ML/MIN/1.73M2
EOSINOPHIL # BLD AUTO: 29 CELLS/UL (ref 15–500)
EOSINOPHIL NFR BLD AUTO: 0.9 %
ERYTHROCYTE [DISTWIDTH] IN BLOOD BY AUTOMATED COUNT: 12.5 % (ref 11–15)
GLUCOSE SERPL-MCNC: 102 MG/DL (ref 65–99)
HBV CORE AB SERPL QL IA: NORMAL
HBV SURFACE AG SERPL QL IA: NORMAL
HCT VFR BLD AUTO: 38.9 % (ref 38.5–50)
HCV AB SERPL QL IA: NORMAL
HGB BLD-MCNC: 12.9 G/DL (ref 13.2–17.1)
HIV 1+2 AB+HIV1 P24 AG SERPL QL IA: NORMAL
IGNF NEG CNTRL BLD: NORMAL
LYMPHOCYTES # BLD AUTO: 870 CELLS/UL (ref 850–3900)
LYMPHOCYTES NFR BLD AUTO: 27.2 %
M TB IFN-G BLD-IMP: NEGATIVE
MCH RBC QN AUTO: 33.1 PG (ref 27–33)
MCHC RBC AUTO-ENTMCNC: 33.2 G/DL (ref 32–36)
MCV RBC AUTO: 99.7 FL (ref 80–100)
MITOGEN IGNF.SPOT COUNT BLD: NORMAL
MONOCYTES # BLD AUTO: 515 CELLS/UL (ref 200–950)
MONOCYTES NFR BLD AUTO: 16.1 %
NEUTROPHILS # BLD AUTO: 1747 CELLS/UL (ref 1500–7800)
NEUTROPHILS NFR BLD AUTO: 54.6 %
PLATELET # BLD AUTO: 97 THOUSAND/UL (ref 140–400)
PMV BLD REES-ECKER: 12.1 FL (ref 7.5–12.5)
POTASSIUM SERPL-SCNC: 4.2 MMOL/L (ref 3.5–5.3)
PROT SERPL-MCNC: 7.6 G/DL (ref 6.1–8.1)
QUEST PANEL A SPOT COUNT: 0
QUEST PANEL B SPOT COUNT: 0
RBC # BLD AUTO: 3.9 MILLION/UL (ref 4.2–5.8)
SODIUM SERPL-SCNC: 139 MMOL/L (ref 135–146)
WBC # BLD AUTO: 3.2 THOUSAND/UL (ref 3.8–10.8)

## 2025-04-22 DIAGNOSIS — D61.818 PANCYTOPENIA: Primary | ICD-10-CM

## 2025-04-25 ENCOUNTER — TELEPHONE (OUTPATIENT)
Dept: DERMATOLOGY | Facility: CLINIC | Age: 45
End: 2025-04-25
Payer: COMMERCIAL

## 2025-05-01 DIAGNOSIS — L40.9 PSORIASIS: Primary | ICD-10-CM

## 2025-05-01 RX ORDER — APREMILAST 30 MG/1
30 TABLET, FILM COATED ORAL 2 TIMES DAILY
Qty: 60 TABLET | Refills: 11 | Status: SHIPPED | OUTPATIENT
Start: 2025-05-01

## 2025-05-02 ENCOUNTER — SPECIALTY PHARMACY (OUTPATIENT)
Dept: PHARMACY | Facility: CLINIC | Age: 45
End: 2025-05-02

## 2025-05-05 DIAGNOSIS — L40.9 PSORIASIS: ICD-10-CM

## 2025-05-05 RX ORDER — APREMILAST 30 MG/1
30 TABLET, FILM COATED ORAL 2 TIMES DAILY
Qty: 60 TABLET | Refills: 11 | Status: SHIPPED | OUTPATIENT
Start: 2025-05-05

## 2025-05-05 NOTE — PROGRESS NOTES
Matilda ESTRADA approved till 05/02/2026. Must fill with [facility] per insurance mandate. Clinical Pharmacist re-routed prescription to [facility].

## 2025-05-06 DIAGNOSIS — I48.91 ATRIAL FIBRILLATION, UNSPECIFIED TYPE (MULTI): ICD-10-CM

## 2025-05-06 RX ORDER — APIXABAN 5 MG/1
5 TABLET, FILM COATED ORAL EVERY 12 HOURS
Qty: 60 TABLET | Refills: 0 | Status: SHIPPED | OUTPATIENT
Start: 2025-05-06

## 2025-06-05 DIAGNOSIS — I48.91 ATRIAL FIBRILLATION, UNSPECIFIED TYPE (MULTI): ICD-10-CM

## 2025-06-05 RX ORDER — APIXABAN 5 MG/1
5 TABLET, FILM COATED ORAL EVERY 12 HOURS
Qty: 60 TABLET | Refills: 0 | Status: SHIPPED | OUTPATIENT
Start: 2025-06-05

## 2025-06-18 ENCOUNTER — TELEPHONE (OUTPATIENT)
Dept: DERMATOLOGY | Facility: CLINIC | Age: 45
End: 2025-06-18
Payer: COMMERCIAL

## 2025-06-25 ENCOUNTER — TELEPHONE (OUTPATIENT)
Dept: DERMATOLOGY | Facility: CLINIC | Age: 45
End: 2025-06-25
Payer: COMMERCIAL

## 2025-06-25 DIAGNOSIS — L40.9 PSORIASIS: ICD-10-CM

## 2025-06-25 RX ORDER — APREMILAST 30 MG/1
30 TABLET, FILM COATED ORAL 2 TIMES DAILY
Qty: 60 TABLET | Refills: 11 | Status: SHIPPED | OUTPATIENT
Start: 2025-06-25

## 2025-07-03 ENCOUNTER — TELEPHONE (OUTPATIENT)
Dept: HEMATOLOGY/ONCOLOGY | Facility: CLINIC | Age: 45
End: 2025-07-03
Payer: COMMERCIAL

## 2025-07-03 NOTE — TELEPHONE ENCOUNTER
Pt returned call to office  Pt will complete labs at [facility] prior to appt w/Dr. Parsons and was transferred to schedule ultrasound

## 2025-07-03 NOTE — TELEPHONE ENCOUNTER
Called and LM for patient that he is scheduled for a New Patient Hematology Consult with Dr. Issa Bowers 07/21/2025 and she would like like for patient to complete labs and a ultrasound of the abdomen complete prior to the follow up 07/21/2025- I provided my contact information for patient to call me back.

## 2025-07-09 ENCOUNTER — HOSPITAL ENCOUNTER (OUTPATIENT)
Dept: RADIOLOGY | Facility: HOSPITAL | Age: 45
Discharge: HOME | End: 2025-07-09
Payer: COMMERCIAL

## 2025-07-09 DIAGNOSIS — D64.9 ANEMIA, UNSPECIFIED TYPE: ICD-10-CM

## 2025-07-09 PROCEDURE — 76700 US EXAM ABDOM COMPLETE: CPT

## 2025-07-09 PROCEDURE — 76700 US EXAM ABDOM COMPLETE: CPT | Performed by: RADIOLOGY

## 2025-07-11 DIAGNOSIS — I48.91 ATRIAL FIBRILLATION, UNSPECIFIED TYPE (MULTI): ICD-10-CM

## 2025-07-11 RX ORDER — APIXABAN 5 MG/1
5 TABLET, FILM COATED ORAL EVERY 12 HOURS
Qty: 60 TABLET | Refills: 0 | Status: SHIPPED | OUTPATIENT
Start: 2025-07-11

## 2025-07-16 ENCOUNTER — LAB (OUTPATIENT)
Dept: LAB | Facility: CLINIC | Age: 45
End: 2025-07-16
Payer: COMMERCIAL

## 2025-07-16 DIAGNOSIS — D64.9 ANEMIA, UNSPECIFIED TYPE: ICD-10-CM

## 2025-07-16 LAB
ALBUMIN SERPL BCP-MCNC: 4.7 G/DL (ref 3.4–5)
ALP SERPL-CCNC: 45 U/L (ref 33–120)
ALT SERPL W P-5'-P-CCNC: 58 U/L (ref 10–52)
ANION GAP SERPL CALC-SCNC: 15 MMOL/L (ref 10–20)
AST SERPL W P-5'-P-CCNC: 55 U/L (ref 9–39)
B2 MICROGLOB SERPL-MCNC: 2.5 MG/L (ref 0.7–2.2)
BASOPHILS # BLD AUTO: 0.07 X10*3/UL (ref 0–0.1)
BASOPHILS NFR BLD AUTO: 1.3 %
BILIRUB SERPL-MCNC: 1.1 MG/DL (ref 0–1.2)
BUN SERPL-MCNC: 18 MG/DL (ref 6–23)
CALCIUM SERPL-MCNC: 9.3 MG/DL (ref 8.6–10.3)
CHLORIDE SERPL-SCNC: 99 MMOL/L (ref 98–107)
CO2 SERPL-SCNC: 26 MMOL/L (ref 21–32)
CREAT SERPL-MCNC: 0.81 MG/DL (ref 0.5–1.3)
EGFRCR SERPLBLD CKD-EPI 2021: >90 ML/MIN/1.73M*2
EOSINOPHIL # BLD AUTO: 0.06 X10*3/UL (ref 0–0.7)
EOSINOPHIL NFR BLD AUTO: 1.1 %
ERYTHROCYTE [DISTWIDTH] IN BLOOD BY AUTOMATED COUNT: 12.7 % (ref 11.5–14.5)
FERRITIN SERPL-MCNC: 565 NG/ML (ref 20–300)
FOLATE SERPL-MCNC: 16.8 NG/ML
GLUCOSE SERPL-MCNC: 91 MG/DL (ref 74–99)
HCT VFR BLD AUTO: 36.8 % (ref 41–52)
HGB BLD-MCNC: 13.1 G/DL (ref 13.5–17.5)
HGB RETIC QN: 37 PG (ref 28–38)
IGA SERPL-MCNC: 341 MG/DL (ref 70–400)
IGG SERPL-MCNC: 1260 MG/DL (ref 700–1600)
IGM SERPL-MCNC: 132 MG/DL (ref 40–230)
IMM GRANULOCYTES # BLD AUTO: 0.03 X10*3/UL (ref 0–0.7)
IMM GRANULOCYTES NFR BLD AUTO: 0.6 % (ref 0–0.9)
IMMATURE RETIC FRACTION: 6 %
IRON SATN MFR SERPL: 36 % (ref 25–45)
IRON SERPL-MCNC: 126 UG/DL (ref 35–150)
LDH SERPL L TO P-CCNC: 196 U/L (ref 84–246)
LYMPHOCYTES # BLD AUTO: 1.29 X10*3/UL (ref 1.2–4.8)
LYMPHOCYTES NFR BLD AUTO: 24.2 %
MCH RBC QN AUTO: 33.7 PG (ref 26–34)
MCHC RBC AUTO-ENTMCNC: 35.6 G/DL (ref 32–36)
MCV RBC AUTO: 95 FL (ref 80–100)
MONOCYTES # BLD AUTO: 0.62 X10*3/UL (ref 0.1–1)
MONOCYTES NFR BLD AUTO: 11.6 %
NEUTROPHILS # BLD AUTO: 3.27 X10*3/UL (ref 1.2–7.7)
NEUTROPHILS NFR BLD AUTO: 61.2 %
NRBC BLD-RTO: 0 /100 WBCS (ref 0–0)
PLATELET # BLD AUTO: 120 X10*3/UL (ref 150–450)
POTASSIUM SERPL-SCNC: 4.2 MMOL/L (ref 3.5–5.3)
PROT SERPL-MCNC: 7.8 G/DL (ref 6.4–8.2)
PROT SERPL-MCNC: 7.9 G/DL (ref 6.4–8.2)
RBC # BLD AUTO: 3.89 X10*6/UL (ref 4.5–5.9)
RETICS #: 0.05 X10*6/UL (ref 0.02–0.12)
RETICS/RBC NFR AUTO: 1.3 % (ref 0.5–2)
SODIUM SERPL-SCNC: 136 MMOL/L (ref 136–145)
TIBC SERPL-MCNC: 349 UG/DL (ref 240–445)
UIBC SERPL-MCNC: 223 UG/DL (ref 110–370)
VIT B12 SERPL-MCNC: 772 PG/ML (ref 211–911)
WBC # BLD AUTO: 5.3 X10*3/UL (ref 4.4–11.3)

## 2025-07-16 PROCEDURE — 80053 COMPREHEN METABOLIC PANEL: CPT

## 2025-07-16 PROCEDURE — 82784 ASSAY IGA/IGD/IGG/IGM EACH: CPT

## 2025-07-16 PROCEDURE — 85045 AUTOMATED RETICULOCYTE COUNT: CPT

## 2025-07-16 PROCEDURE — 83615 LACTATE (LD) (LDH) ENZYME: CPT

## 2025-07-16 PROCEDURE — 82746 ASSAY OF FOLIC ACID SERUM: CPT

## 2025-07-16 PROCEDURE — 82728 ASSAY OF FERRITIN: CPT

## 2025-07-16 PROCEDURE — 82607 VITAMIN B-12: CPT

## 2025-07-16 PROCEDURE — 85025 COMPLETE CBC W/AUTO DIFF WBC: CPT

## 2025-07-16 PROCEDURE — 83540 ASSAY OF IRON: CPT

## 2025-07-16 PROCEDURE — 83521 IG LIGHT CHAINS FREE EACH: CPT

## 2025-07-16 PROCEDURE — 82668 ASSAY OF ERYTHROPOIETIN: CPT

## 2025-07-16 PROCEDURE — 82232 ASSAY OF BETA-2 PROTEIN: CPT

## 2025-07-16 PROCEDURE — 84155 ASSAY OF PROTEIN SERUM: CPT

## 2025-07-16 PROCEDURE — 36415 COLL VENOUS BLD VENIPUNCTURE: CPT

## 2025-07-16 PROCEDURE — 88185 FLOWCYTOMETRY/TC ADD-ON: CPT | Mod: TC

## 2025-07-17 LAB
KAPPA LC SERPL-MCNC: 2.3 MG/DL (ref 0.33–1.94)
KAPPA LC/LAMBDA SER: 1.06 {RATIO} (ref 0.26–1.65)
LAMBDA LC SERPL-MCNC: 2.16 MG/DL (ref 0.57–2.63)

## 2025-07-18 LAB
CELL COUNT (BLOOD): 5.3 X10*3/UL
CELL POPULATIONS: NORMAL
CYTOGENETICS/MOLECULAR TEST ORDERED: NO
DIAGNOSIS: NORMAL
EPO SERPL-ACNC: 7 MU/ML (ref 4–27)
FLOW DIFFERENTIAL: NORMAL
FLOW TEST ORDERED: NORMAL
LAB TEST METHOD: NORMAL
NUMBER OF CELLS COLLECTED: NORMAL PER TUBE
PATH REPORT.TOTAL CANCER: NORMAL
RBC MORPH BLD: NORMAL
SIGNATURE COMMENT: NORMAL
SPECIMEN VIABILITY: NORMAL
WBC MORPH BLD: NORMAL

## 2025-07-21 ENCOUNTER — OFFICE VISIT (OUTPATIENT)
Dept: HEMATOLOGY/ONCOLOGY | Facility: CLINIC | Age: 45
End: 2025-07-21
Payer: COMMERCIAL

## 2025-07-21 VITALS
BODY MASS INDEX: 35.27 KG/M2 | HEIGHT: 70 IN | WEIGHT: 246.36 LBS | TEMPERATURE: 97 F | SYSTOLIC BLOOD PRESSURE: 141 MMHG | OXYGEN SATURATION: 97 % | DIASTOLIC BLOOD PRESSURE: 91 MMHG | HEART RATE: 83 BPM | RESPIRATION RATE: 18 BRPM

## 2025-07-21 DIAGNOSIS — D64.9 ANEMIA, UNSPECIFIED TYPE: Primary | ICD-10-CM

## 2025-07-21 PROCEDURE — 3008F BODY MASS INDEX DOCD: CPT | Performed by: INTERNAL MEDICINE

## 2025-07-21 PROCEDURE — 3077F SYST BP >= 140 MM HG: CPT | Performed by: INTERNAL MEDICINE

## 2025-07-21 PROCEDURE — 99215 OFFICE O/P EST HI 40 MIN: CPT | Performed by: INTERNAL MEDICINE

## 2025-07-21 PROCEDURE — 1036F TOBACCO NON-USER: CPT | Performed by: INTERNAL MEDICINE

## 2025-07-21 PROCEDURE — 3080F DIAST BP >= 90 MM HG: CPT | Performed by: INTERNAL MEDICINE

## 2025-07-21 PROCEDURE — 99205 OFFICE O/P NEW HI 60 MIN: CPT | Performed by: INTERNAL MEDICINE

## 2025-07-21 ASSESSMENT — PAIN SCALES - GENERAL: PAINLEVEL_OUTOF10: 0-NO PAIN

## 2025-07-21 NOTE — PROGRESS NOTES
Patient ID: Justo Whitehead is a 44 y.o. male.  Referring Physician: Tejinder Zimmerman, APRN-CNP  [address]  [place] [address] [place], [place] [id number]  Primary Care Provider: Chelsea Hanna MD  Referral Reason: pancytopenia    Subjective:  psoriasis    Heme/Onc History:  He drinks 2 750 ml bottles of vodka every day. his psoriasis is excessive and bothersome .         Past Medical History: Medical History[1]  Social History:   Social History     Socioeconomic History    Marital status: Single     Spouse name: Not on file    Number of children: Not on file    Years of education: Not on file    Highest education level: Not on file   Occupational History    Not on file   Tobacco Use    Smoking status: Never    Smokeless tobacco: Never   Substance and Sexual Activity    Alcohol use: Not Currently    Drug use: Not Currently    Sexual activity: Defer   Other Topics Concern    Not on file   Social History Narrative    Not on file     Social Drivers of Health     Financial Resource Strain: Low Risk  (1/10/2024)    Overall Financial Resource Strain (CARDIA)     Difficulty of Paying Living Expenses: Not hard at all   Food Insecurity: Not on file   Transportation Needs: No Transportation Needs (1/10/2024)    PRAPARE - Transportation     Lack of Transportation (Medical): No     Lack of Transportation (Non-Medical): No   Physical Activity: Not on file   Stress: Not on file   Social Connections: Not on file   Intimate Partner Violence: Not on file   Housing Stability: Low Risk  (1/10/2024)    Housing Stability Vital Sign     Unable to Pay for Housing in the Last Year: No     Number of Places Lived in the Last Year: 1     Unstable Housing in the Last Year: No     Surgical History: Surgical History[2]  Family History: Family History[3]   reports that he has never smoked. He has never used smokeless tobacco.  Oncology Family history: Cancer-related family history is not on file.    Review Of Systems:  As stated per in HPI; otherwise  "all other 12 point ROS are negative    Physical Exam:  BP (!) 141/91 (BP Location: Left arm, Patient Position: Sitting, BP Cuff Size: Adult long)   Pulse 83   Temp 36.1 °C (97 °F) (Temporal)   Resp 18   Ht (S) 1.768 m (5' 9.61\")   Wt 112 kg (246 lb 5.8 oz)   SpO2 97%   BMI 35.75 kg/m²   BSA: 2.35 meters squared  General: awake/alert/oriented x3, no distress, alert and cooperative  Head: Short hair fully covering scalp. Symmetric facial expressions  Eyes: PERRL, EOMI, clear sclera, eyebrows present.  Ears/Nose/Mouth/Throat:  Oral mucous membranes moist. No oral ulcers. No palpable pre/post-auricular lymph nodes  Neck: No palpable cervical chain lymph nodes  Respiratory: unlabored breathing on room air, good chest expansion, thorax symmetric  Cardio: Regular rate and rhythm, normal S1 and S2, radial pulses symmetric  GI: Nondistended, soft, non-tender abdomen  Musculoskeletal: Normal muscle bulk and tone, ROM intact, no joint swelling.  Rises from chair and walks unassisted.  Extremities: No ankle swelling, no arm or leg wounds  Neuro: Alert, cognition intact, speech normal. Facial expressions symmetric.  No motor deficits noted. Sensation intact to touch and hot/cold.   Able to stand from seated position unassisted and walks around the room unassisted.  Psychological: Appropriate mood and behavior.  Skin: Warm and dry, no lesions, no rashes    Results:  Diagnostic Results   Lab Results   Component Value Date    WBC 5.3 07/16/2025    HGB 13.1 (L) 07/16/2025    HCT 36.8 (L) 07/16/2025    MCV 95 07/16/2025     (L) 07/16/2025     Lab Results   Component Value Date    CALCIUM 9.3 07/16/2025     07/16/2025    K 4.2 07/16/2025    CO2 26 07/16/2025    CL 99 07/16/2025    BUN 18 07/16/2025    CREATININE 0.81 07/16/2025    ALT 58 (H) 07/16/2025    AST 55 (H) 07/16/2025     Current Medications[4]     Assessment/Plan:  ? Pancytopenia:    Abd US IMPRESSION:  1.  Mildly enlarged liver with diffuse fatty " infiltration.  2. Mild splenomegaly.    NGS is pending. Flow negative. Nutritional def are ruled out    Pancytopenia is due to ETOH abuse causing BM suppression and also due to HSM.    He can be treated with Skyrizi for his psoriasis    No need for BMB or further hematology FU needed. ETOH cessation is advised.    Diagnoses and all orders for this visit:  Anemia, unspecified type  -     US abdomen complete; Future  -     Iron and TIBC; Future  -     Vitamin B12; Future  -     Reticulocytes; Future  -     Ferritin; Future  -     Erythropoietin; Future  -     Lactate Dehydrogenase; Future  -     Comprehensive Metabolic Panel; Future  -     Folate; Future  -     CBC and Auto Differential; Future  -     Serum Protein Electrophoresis + Immunofixation; Future  -     [place]/Lambda Free Light Chain, Serum (Includes Kappa, Lambda and K/L ratio); Future  -     Beta 2 Microglobuin; Future  -     Immunoglobulins (IgG, IgA, IgM); Future  -     Flow, Path Review & Reflex Genetics, Blood; Future  -     Myeloid Malignancies Panel - Myeloid NGS; Future  Other orders  -     Referral To Hematology and Oncology       Performance Status: Asymptomatic    I spent more than 60 minutes for the patient today, including face-to-face conversation, pre-visit preparation, post-visit orders, and others.   Issa Bowers MD                              [1]   Past Medical History:  Diagnosis Date    Anxiety     Atrial fibrillation (Multi)     Hyperlipidemia     Hypertension    [2]   Past Surgical History:  Procedure Laterality Date    CARDIAC ELECTROPHYSIOLOGY PROCEDURE N/A 2/6/2024    Procedure: Cardioversion;  Surgeon: Dago Josue DO;  Location: [facility];  Service: Cardiovascular;  Laterality: N/A;  no pre cert needed ref # [id number] per SHEI    FOOT TENDON SURGERY Right     Tendon reattachment surgery   [3]   Family History  Problem Relation Name Age of Onset    Hypertension Mother     [4]   Current Outpatient Medications:      apremilast (Otezla) 30 mg tablet, Take 1 tablet (30 mg) by mouth 2 times a day. Do not crush, chew, or split tablets., Disp: 60 tablet, Rfl: 11    carvedilol CR (Coreg CR) 80 mg 24 hr capsule, TAKE 1 CAPSULE (80 MG) BY MOUTH ONCE DAILY., Disp: 90 capsule, Rfl: 3    citalopram (CeleXA) 10 mg tablet, Take 1 tablet (10 mg) by mouth once daily., Disp: , Rfl:     digoxin (Lanoxin) 125 MCG tablet, TAKE 1 TABLET BY MOUTH ONCE DAILY., Disp: 90 tablet, Rfl: 3    Eliquis 5 mg tablet, TAKE 1 TABLET BY MOUTH EVERY 12 HOURS, Disp: 60 tablet, Rfl: 0    ergocalciferol (Vitamin D-2) 1.25 MG (98055 UT) capsule, TAKE 1 CAPSULE (08239 UNITS) BY MOUTH ONE TIME PER WEEK, Disp: 12 capsule, Rfl: 0    folic acid (Folvite) 1 mg tablet, Take by mouth once daily., Disp: , Rfl:     lisinopril 20 mg tablet, TAKE 1 TABLET BY MOUTH EVERY DAY, Disp: 90 tablet, Rfl: 1

## 2025-07-21 NOTE — PROGRESS NOTES
Patient here for follow up visit with Dr Issa Bowers for Dx of pancytopenia   Patient here with wife     Medications and Allergies reviewed and reconciled this visit by MD per her request         Pt reports appetite is good. room 9 with wife/. Pt here s/p US . it shows fatty liver and mildly enlarged spleen. I did ask about ETOH intake. He drinks 2 750 ml bottles of vodka every day. his psoriasis is excessive and bothersome . His derm would not prescribe medication due to labs.     Fevers/Chills/weight loss/night sweats: denies       Follow up per  MD  request. pancytopenia is due to ETOH abuse causing BM suppression and also due to HSM.     He can be treated with Skyrizi for his psoriasis     No need for BMB or further hematology FU needed. ETOH cessation is advised.    Pt. reports availability and use of mychart, Reviewed this is a good place to communicate with the team as well as review labs and upcoming orders.     No barriers to education noted, patient agrees to current plan and verbalized understanding using teach back method.

## 2025-07-22 LAB
ALBUMIN: 4.8 G/DL (ref 3.4–5)
ALPHA 1 GLOBULIN: 0.3 G/DL (ref 0.2–0.6)
ALPHA 2 GLOBULIN: 0.6 G/DL (ref 0.4–1.1)
BETA GLOBULIN: 0.9 G/DL (ref 0.5–1.2)
ELECTRONICALLY SIGNED BY: NORMAL
GAMMA GLOBULIN: 1.3 G/DL (ref 0.5–1.4)
IMMUNOFIXATION COMMENT: NORMAL
MYELOID NGS RESULTS: NORMAL
PATH REVIEW - SERUM IMMUNOFIXATION: NORMAL
PATH REVIEW-SERUM PROTEIN ELECTROPHORESIS: NORMAL
PROTEIN ELECTROPHORESIS COMMENT: NORMAL

## 2025-07-23 ENCOUNTER — TELEPHONE (OUTPATIENT)
Dept: DERMATOLOGY | Facility: CLINIC | Age: 45
End: 2025-07-23
Payer: COMMERCIAL

## 2025-07-23 NOTE — TELEPHONE ENCOUNTER
Patient is wanting to discuss prescription and test results for skin check. Patient can be reached at [phone].                     
68.3

## 2025-07-24 DIAGNOSIS — L40.9 PSORIASIS: ICD-10-CM

## 2025-07-24 LAB
CHROM ANALY OVERALL INTERP-IMP: NORMAL
ELECTRONICALLY SIGNED BY CYTOGENETICS: NORMAL

## 2025-07-24 RX ORDER — APREMILAST 30 MG/1
30 TABLET, FILM COATED ORAL 2 TIMES DAILY
Qty: 60 TABLET | Refills: 11 | Status: SHIPPED | OUTPATIENT
Start: 2025-07-24

## 2025-07-29 ENCOUNTER — TELEPHONE (OUTPATIENT)
Dept: DERMATOLOGY | Facility: CLINIC | Age: 45
End: 2025-07-29
Payer: COMMERCIAL

## 2025-08-10 DIAGNOSIS — I48.91 ATRIAL FIBRILLATION, UNSPECIFIED TYPE (MULTI): ICD-10-CM

## 2025-08-11 RX ORDER — APIXABAN 5 MG/1
5 TABLET, FILM COATED ORAL EVERY 12 HOURS
Qty: 60 TABLET | Refills: 0 | Status: SHIPPED | OUTPATIENT
Start: 2025-08-11

## 2025-08-15 ENCOUNTER — APPOINTMENT (OUTPATIENT)
Dept: DERMATOLOGY | Facility: CLINIC | Age: 45
End: 2025-08-15
Payer: COMMERCIAL

## 2025-11-11 ENCOUNTER — APPOINTMENT (OUTPATIENT)
Dept: DERMATOLOGY | Facility: CLINIC | Age: 45
End: 2025-11-11
Payer: COMMERCIAL

## (undated) DEVICE — PAD, ELECTRODE DEFIB PADPRO ADULT STRL W/ADAPTER